# Patient Record
Sex: MALE | Race: WHITE | NOT HISPANIC OR LATINO
[De-identification: names, ages, dates, MRNs, and addresses within clinical notes are randomized per-mention and may not be internally consistent; named-entity substitution may affect disease eponyms.]

---

## 2019-11-24 ENCOUNTER — FORM ENCOUNTER (OUTPATIENT)
Age: 79
End: 2019-11-24

## 2020-01-02 PROBLEM — Z00.00 ENCOUNTER FOR PREVENTIVE HEALTH EXAMINATION: Status: ACTIVE | Noted: 2020-01-02

## 2020-01-07 ENCOUNTER — RECORD ABSTRACTING (OUTPATIENT)
Age: 80
End: 2020-01-07

## 2020-01-07 DIAGNOSIS — Z87.19 PERSONAL HISTORY OF OTHER DISEASES OF THE DIGESTIVE SYSTEM: ICD-10-CM

## 2020-01-07 DIAGNOSIS — Z87.891 PERSONAL HISTORY OF NICOTINE DEPENDENCE: ICD-10-CM

## 2020-01-07 DIAGNOSIS — Z87.438 PERSONAL HISTORY OF OTHER DISEASES OF MALE GENITAL ORGANS: ICD-10-CM

## 2020-01-07 DIAGNOSIS — Z87.448 PERSONAL HISTORY OF OTHER DISEASES OF URINARY SYSTEM: ICD-10-CM

## 2020-01-07 DIAGNOSIS — R39.9 UNSPECIFIED SYMPTOMS AND SIGNS INVOLVING THE GENITOURINARY SYSTEM: ICD-10-CM

## 2020-01-07 DIAGNOSIS — Z87.39 PERSONAL HISTORY OF OTHER DISEASES OF THE MUSCULOSKELETAL SYSTEM AND CONNECTIVE TISSUE: ICD-10-CM

## 2020-01-07 LAB
PSA FREE FLD-MCNC: 29
PSA FREE SERPL-MCNC: 0.6
PSA SERPL-MCNC: 2.1
TESTOST BND SERPL-MCNC: 265.6

## 2020-01-07 RX ORDER — MESALAMINE 500 MG/1
CAPSULE ORAL
Refills: 0 | Status: ACTIVE | COMMUNITY

## 2020-01-07 RX ORDER — FINASTERIDE 1 MG/1
TABLET ORAL
Refills: 0 | Status: ACTIVE | COMMUNITY

## 2020-01-07 RX ORDER — SILODOSIN 8 MG/1
CAPSULE ORAL
Refills: 0 | Status: ACTIVE | COMMUNITY

## 2020-01-07 RX ORDER — UBIQUINOL 100 MG
CAPSULE ORAL
Refills: 0 | Status: ACTIVE | COMMUNITY

## 2020-04-13 RX ORDER — FINASTERIDE 5 MG/1
5 TABLET, FILM COATED ORAL DAILY
Qty: 90 | Refills: 1 | Status: ACTIVE | COMMUNITY
Start: 2020-04-13 | End: 1900-01-01

## 2020-08-19 ENCOUNTER — APPOINTMENT (OUTPATIENT)
Dept: UROLOGY | Facility: CLINIC | Age: 80
End: 2020-08-19

## 2020-09-14 RX ORDER — SILODOSIN 8 MG/1
8 CAPSULE ORAL
Qty: 90 | Refills: 0 | Status: ACTIVE | COMMUNITY
Start: 2020-09-14 | End: 1900-01-01

## 2020-10-20 ENCOUNTER — APPOINTMENT (OUTPATIENT)
Dept: UROLOGY | Facility: CLINIC | Age: 80
End: 2020-10-20

## 2020-12-04 ENCOUNTER — APPOINTMENT (OUTPATIENT)
Dept: UROLOGY | Facility: CLINIC | Age: 80
End: 2020-12-04
Payer: MEDICARE

## 2020-12-04 DIAGNOSIS — R82.89 OTHER ABNRM FNDNGS ON CYTOLOGICAL: ICD-10-CM

## 2020-12-04 DIAGNOSIS — R97.20 ELEVATED PROSTATE, SPECIFIC ANTIGEN [PSA]: ICD-10-CM

## 2020-12-04 DIAGNOSIS — N13.8 BENIGN PROSTATIC HYPERPLASIA WITH LOWER URINARY TRACT SYMPMS: ICD-10-CM

## 2020-12-04 DIAGNOSIS — N40.1 BENIGN PROSTATIC HYPERPLASIA WITH LOWER URINARY TRACT SYMPMS: ICD-10-CM

## 2020-12-04 DIAGNOSIS — R31.21 ASYMPTOMATIC MICROSCOPIC HEMATURIA: ICD-10-CM

## 2020-12-04 PROCEDURE — 99443: CPT | Mod: 95

## 2020-12-04 RX ORDER — FINASTERIDE 5 MG/1
5 TABLET, FILM COATED ORAL DAILY
Qty: 90 | Refills: 3 | Status: ACTIVE | COMMUNITY
Start: 2020-12-04 | End: 1900-01-01

## 2020-12-04 RX ORDER — SILODOSIN 8 MG/1
8 CAPSULE ORAL
Qty: 90 | Refills: 3 | Status: ACTIVE | COMMUNITY
Start: 2020-12-04 | End: 1900-01-01

## 2020-12-04 NOTE — HISTORY OF PRESENT ILLNESS
[Other Location: e.g. School (Enter Location, City,State)___] : at [unfilled], at the time of the visit. [Other Location: e.g. Home (Enter Location, City,State)___] : at [unfilled] [Verbal consent obtained from patient] : the patient, [unfilled] [FreeTextEntry1] :  The patient-doctor relationship has been established via telephone communication. The patient's identity has been confirmed. The patient was previously emailed a copy of the telemedicine consent. They have had a chance to review and has now given verbal consent and has requested care to be assessed and treated through telemedicine and understands there maybe limitations in this process and they may need further follow up care in the office and or hospital settings. \par Verbal consent given on 12/4/2020, at 12:59 PM, by Rohan Berger.\par \par This 81 YO M was last seen on 9/3/2019 for BPH for which he is on silodosin and finasteride, and microhematuria with a prior positive FISH test. His PSA values have been: 2.1 (3/13/2019) and 2.2 (9/3/2019), both while on finasteride. At that visit I ordered UA C+S cytology FISH and PSA to be done, but patient developed issues with his Colon cancer that was resected in 2017 and did not do these.\par \par This visit today is to reassess treatment and reorder his medication. He tells me that he had a liver biopsy last year which demonstrated recurrence of the colon cancer and he underwent an ablation at [facility], where he continues to be followed. He has a PET  CT scan scheduled for next week. He tells me that he urinates with a good stream, no dysuria or hematuria, but continues to have nocturia at least x 2. HIs Crohns disease and HTN are presently under control, and he remains allergic to sulfa drugs only.\par \par  The patient denies fevers, chills, nausea and or vomiting and no unexplained weight loss. \par All pertinent parts of the patient PFSH (past medical, family and social histories), laboratory, radiological studies and physician notes were reviewed prior to starting the face to face portion of the telemedicine visit. Questionnaire results were discussed with patient.\par

## 2020-12-04 NOTE — LETTER BODY
[Dear  ___] : Dear  [unfilled], [Please see my note below.] : Please see my note below. [Consult Closing:] : Thank you very much for allowing me to participate in the care of this patient.  If you have any questions, please do not hesitate to contact me. [FreeTextEntry2] : Caren Behar, MD [FreeTextEntry1] : I had the pleasure of evaluation of your patient today via a telehealth virtual visit.\par  [FreeTextEntry3] : Best Regards, \par \par Swati Story MD\par

## 2020-12-04 NOTE — ASSESSMENT
[FreeTextEntry1] : We reviewed the present medicine regimen and I reordered the silodosin and finasteride. I again recommended that he have the UA, C+S cytology, FISH test and PSA levels drawn and call me to review the results. If all remains table, we will plan on FU in 1 tear. We ended the audio portion of the visit at 1:15 PM. Swati Story MD\par

## 2022-04-13 ENCOUNTER — EMERGENCY (EMERGENCY)
Facility: HOSPITAL | Age: 82
LOS: 1 days | Discharge: HOME | End: 2022-04-13
Attending: EMERGENCY MEDICINE | Admitting: EMERGENCY MEDICINE
Payer: MEDICARE

## 2022-04-13 VITALS
HEIGHT: 69 IN | TEMPERATURE: 98 F | DIASTOLIC BLOOD PRESSURE: 74 MMHG | RESPIRATION RATE: 18 BRPM | SYSTOLIC BLOOD PRESSURE: 106 MMHG | HEART RATE: 102 BPM | OXYGEN SATURATION: 100 % | WEIGHT: 147.05 LBS

## 2022-04-13 VITALS
OXYGEN SATURATION: 100 % | RESPIRATION RATE: 18 BRPM | DIASTOLIC BLOOD PRESSURE: 60 MMHG | TEMPERATURE: 98 F | HEART RATE: 83 BPM | SYSTOLIC BLOOD PRESSURE: 100 MMHG

## 2022-04-13 LAB
APPEARANCE UR: CLEAR — SIGNIFICANT CHANGE UP
BACTERIA # UR AUTO: PRESENT /HPF
BILIRUB UR-MCNC: NEGATIVE — SIGNIFICANT CHANGE UP
COLOR SPEC: YELLOW — SIGNIFICANT CHANGE UP
COMMENT - URINE: SIGNIFICANT CHANGE UP
DIFF PNL FLD: ABNORMAL
EPI CELLS # UR: SIGNIFICANT CHANGE UP /HPF (ref 0–5)
GLUCOSE UR QL: NEGATIVE — SIGNIFICANT CHANGE UP
KETONES UR-MCNC: NEGATIVE — SIGNIFICANT CHANGE UP
LEUKOCYTE ESTERASE UR-ACNC: ABNORMAL
NITRITE UR-MCNC: POSITIVE
PH UR: 7 — SIGNIFICANT CHANGE UP (ref 5–8)
PROT UR-MCNC: 100 MG/DL
RBC CASTS # UR COMP ASSIST: < 5 /HPF — SIGNIFICANT CHANGE UP
SP GR SPEC: 1.02 — SIGNIFICANT CHANGE UP (ref 1–1.03)
UROBILINOGEN FLD QL: 0.2 E.U./DL — SIGNIFICANT CHANGE UP
WBC UR QL: ABNORMAL /HPF

## 2022-04-13 PROCEDURE — 87186 SC STD MICRODIL/AGAR DIL: CPT

## 2022-04-13 PROCEDURE — 81001 URINALYSIS AUTO W/SCOPE: CPT

## 2022-04-13 PROCEDURE — 99284 EMERGENCY DEPT VISIT MOD MDM: CPT

## 2022-04-13 PROCEDURE — 51702 INSERT TEMP BLADDER CATH: CPT

## 2022-04-13 PROCEDURE — 87086 URINE CULTURE/COLONY COUNT: CPT

## 2022-04-13 PROCEDURE — 99283 EMERGENCY DEPT VISIT LOW MDM: CPT | Mod: FS

## 2022-04-13 RX ORDER — CEFTRIAXONE 500 MG/1
1000 INJECTION, POWDER, FOR SOLUTION INTRAMUSCULAR; INTRAVENOUS ONCE
Refills: 0 | Status: DISCONTINUED | OUTPATIENT
Start: 2022-04-13 | End: 2022-04-13

## 2022-04-13 RX ORDER — CEFPODOXIME PROXETIL 100 MG
1 TABLET ORAL
Qty: 14 | Refills: 0
Start: 2022-04-13 | End: 2022-04-19

## 2022-04-13 RX ORDER — CEFPODOXIME PROXETIL 100 MG
100 TABLET ORAL ONCE
Refills: 0 | Status: COMPLETED | OUTPATIENT
Start: 2022-04-13 | End: 2022-04-13

## 2022-04-13 RX ADMIN — Medication 100 MILLIGRAM(S): at 15:14

## 2022-04-13 NOTE — ED ADULT NURSE NOTE - NSIMPLEMENTINTERV_GEN_ALL_ED
Implemented All Fall Risk Interventions:  [place] to call system. Call bell, personal items and telephone within reach. Instruct patient to call for assistance. Room bathroom lighting operational. Non-slip footwear when patient is off stretcher. Physically safe environment: no spills, clutter or unnecessary equipment. Stretcher in lowest position, wheels locked, appropriate side rails in place. Provide visual cue, wrist band, yellow gown, etc. Monitor gait and stability. Monitor for mental status changes and reorient to person, place, and time. Review medications for side effects contributing to fall risk. Reinforce activity limits and safety measures with patient and family.

## 2022-04-13 NOTE — ED PROVIDER NOTE - PATIENT PORTAL LINK FT
You can access the FollowMyHealth Patient Portal offered by [facility] by registering at the following website: [email or web address]. By joining [facility]’s FollowMyHealth portal, you will also be able to view your health information using other applications (apps) compatible with our system.

## 2022-04-13 NOTE — ED PROVIDER NOTE - NS ED ATTENDING STATEMENT MOD
This was a shared visit with the BRITTA. I reviewed and verified the documentation and independently performed the documented:

## 2022-04-13 NOTE — ED PROVIDER NOTE - OBJECTIVE STATEMENT
80yo male with pmhx urinary retention with indwelling cam cathether BIBEMS for cam exchange. Pt currently at rehab and states he did not want staff to care for the catheter. He reports catheter was first placed 1mo ago at [facility] for urinary retention. He states he has been seen by a urologist with TOV but had to have catheter replaced. He states he is scheduled for urology follow up in a few days. Now reporting some drainage around the catheter. He denies fever, chills, abdominal pain, nausea/vomiting, hematuria.

## 2022-04-13 NOTE — ED PROVIDER NOTE - CLINICAL SUMMARY MEDICAL DECISION MAKING FREE TEXT BOX
Pt afebrile and hemodynamically stable. Here for cam exchange. Pt requesting TOV prior to replacement. Observed for 1.5hr but unable to urinate so cam replaced. UA notable for UTI, urine culture sent and pending. Pt given ceftriaxone 1g IV while in ED, dc with rx for cefpodoxime 100mg bid x7 days. Strict return precautions given. Pt has urology follow up scheduled.

## 2022-04-13 NOTE — ED ADULT NURSE REASSESSMENT NOTE - NS ED NURSE REASSESS COMMENT FT1
Christopher catheter inserted using sterile technique, draining by gravity, secured with StatLock. Second RN present to confirm sterility. UA, UC sent to lab.

## 2022-04-13 NOTE — ED ADULT NURSE NOTE - CHIEF COMPLAINT QUOTE
Pt BIBA from [facility] nursing [place] w/ cam catheter in place for 1 month.  Hx colon ca, last chemo 9 days ago. Pt scheduled for removal of cam catheter at nursing home, did not trust nursing home staff for removal, requested transfer to ED for removal. Catheter draining w/o issue.

## 2022-04-13 NOTE — ED PROVIDER NOTE - PHYSICAL EXAMINATION
VITAL SIGNS: I have reviewed nursing notes and confirm.  CONSTITUTIONAL: Well-developed; in no acute distress.   SKIN:  warm and dry, no acute rash.   HEAD:  normocephalic, atraumatic.  EYES: PERRL, EOM intact; conjunctiva and sclera clear.  ENT: No nasal discharge; airway clear.   NECK: Supple; non tender.  CARD: S1, S2 normal; no murmurs, gallops, or rubs. Regular rate and rhythm.   RESP:  Clear to auscultation b/l, no wheezes, rales or rhonchi.  ABD: Normal bowel sounds; soft; non-distended; non-tender; no guarding/ rebound.  : Christopher in place with pale yellow urine in leg bag. He has small amount of drainage at urethral meatus.   EXT: Normal ROM. No clubbing, cyanosis or edema. 2+ pulses to b/l ue/le.  NEURO: Alert, oriented, grossly unremarkable  PSYCH: Cooperative, mood and affect appropriate.

## 2022-04-13 NOTE — ED PROVIDER NOTE - NSFOLLOWUPINSTRUCTIONS_ED_ALL_ED_FT
Your cam catheter was exchanged today.    You have a urinary tract infection and were given a dose of ceftriaxone IV while in the Emergency Department. Please take one tab of cefpodoxime 100mg twice daily for 1 week.    Please follow up with your urologist as scheduled.    Return to the Emergency Department if you develop fever>100.4F, abdominal pain, vomiting, worsening pain at cam site or any other concerns.      Urinary Tract Infection, Adult       A urinary tract infection (UTI) is an infection of any part of the urinary tract. The urinary tract includes the kidneys, ureters, bladder, and urethra. These organs make, store, and get rid of urine in the body.    An upper UTI affects the ureters and kidneys. A lower UTI affects the bladder and urethra.      What are the causes?    Most urinary tract infections are caused by bacteria in your genital area around your urethra, where urine leaves your body. These bacteria grow and cause inflammation of your urinary tract.      What increases the risk?    You are more likely to develop this condition if:  •You have a urinary catheter that stays in place.      •You are not able to control when you urinate or have a bowel movement (incontinence).    •You are female and you:  •Use a spermicide or diaphragm for birth control.      •Have low estrogen levels.      •Are pregnant.        •You have certain genes that increase your risk.      •You are sexually active.      •You take antibiotic medicines.    •You have a condition that causes your flow of urine to slow down, such as:  •An enlarged prostate, if you are male.      •Blockage in your urethra.      •A kidney stone.      •A nerve condition that affects your bladder control (neurogenic bladder).      •Not getting enough to drink, or not urinating often.      •You have certain medical conditions, such as:  •Diabetes.      •A weak disease-fighting system (immunesystem).      •Sickle cell disease.      •Gout.      •Spinal cord injury.          What are the signs or symptoms?    Symptoms of this condition include:  •Needing to urinate right away (urgency).      •Frequent urination. This may include small amounts of urine each time you urinate.      •Pain or burning with urination.      •Blood in the urine.      •Urine that smells bad or unusual.      •Trouble urinating.      •Cloudy urine.      •Vaginal discharge, if you are female.      •Pain in the abdomen or the lower back.      You may also have:  •Vomiting or a decreased appetite.      •Confusion.      •Irritability or tiredness.      •A fever or chills.      •Diarrhea.      The first symptom in older adults may be confusion. In some cases, they may not have any symptoms until the infection has worsened.      How is this diagnosed?    This condition is diagnosed based on your medical history and a physical exam. You may also have other tests, including:  •Urine tests.      •Blood tests.      •Tests for STIs (sexually transmitted infections).      If you have had more than one UTI, a cystoscopy or imaging studies may be done to determine the cause of the infections.      How is this treated?    Treatment for this condition includes:  •Antibiotic medicine.      •Over-the-counter medicines to treat discomfort.      •Drinking enough water to stay hydrated.      If you have frequent infections or have other conditions such as a kidney stone, you may need to see a health care provider who specializes in the urinary tract (urologist).    In rare cases, urinary tract infections can cause sepsis. Sepsis is a life-threatening condition that occurs when the body responds to an infection. Sepsis is treated in the hospital with IV antibiotics, fluids, and other medicines.      Follow these instructions at home:     Medicines     •Take over-the-counter and prescription medicines only as told by your health care provider.      •If you were prescribed an antibiotic medicine, take it as told by your health care provider. Do not stop using the antibiotic even if you start to feel better.      General instructions   •Make sure you:  •Empty your bladder often and completely. Do not hold urine for long periods of time.      •Empty your bladder after sex.      •Wipe from front to back after urinating or having a bowel movement if you are female. Use each tissue only one time when you wipe.        •Drink enough fluid to keep your urine pale yellow.      •Keep all follow-up visits. This is important.        Contact a health care provider if:    •Your symptoms do not get better after 1–2 days.      •Your symptoms go away and then return.        Get help right away if:    •You have severe pain in your back or your lower abdomen.      •You have a fever or chills.      •You have nausea or vomiting.        Summary    •A urinary tract infection (UTI) is an infection of any part of the urinary tract, which includes the kidneys, ureters, bladder, and urethra.      •Most urinary tract infections are caused by bacteria in your genital area.      •Treatment for this condition often includes antibiotic medicines.      •If you were prescribed an antibiotic medicine, take it as told by your health care provider. Do not stop using the antibiotic even if you start to feel better.      •Keep all follow-up visits. This is important.      This information is not intended to replace advice given to you by your health care provider. Make sure you discuss any questions you have with your health care provider.

## 2022-04-13 NOTE — ED PROVIDER NOTE - CARE PLAN
Principal Discharge DX:	Christopher catheter problem  Secondary Diagnosis:	Urinary tract infection   1

## 2022-04-13 NOTE — ED ADULT NURSE NOTE - OBJECTIVE STATEMENT
81y M, PMHx of colon cancer (last chemo 9 days ago) and cam catheter in place x1 month. Presents to the ED requesting to have his cam cath removed. Pt stated, "I do not trust the nursing home staff to remove my cam and I wanted it removed in the ER." Denies CP, back pain, abdominal pain, fever, chills, nausea, blood thinners.

## 2022-04-13 NOTE — ED PROVIDER NOTE - ATTENDING CONTRIBUTION TO CARE
Mild triage tachycardia self resolved, other vitals wnl. Exam as above.  Failed TOV, cam replaced.   abx for possible UTI.   Discussed importance of outpt follow up and return precautions. Clinically no indication for further emergent ED workup or hospitalization at this time. Comfortable for dc, outpt f/u.

## 2022-04-13 NOTE — ED ADULT TRIAGE NOTE - CHIEF COMPLAINT QUOTE
Pt BIBA from [facility] nursing [place] w/ cam catheter in place for 1 month.  Hx colon ca, last chemo 9 days ago. Pt scheduled for removal of cam catheter at nursing home, did not trust nursing home staff for removal, requested transfer to ED for removal. Catheter draining w/o issue.

## 2022-04-13 NOTE — ED PROVIDER NOTE - NS ED ROS FT
Constitutional: No fever. No chills.  Eyes: No redness. No discharge. No vision change.   ENT: No sore throat. No ear pain.  Cardiovascular: No chest pain. No leg swelling.  Respiratory: No cough. No shortness of breath.  GI: No abdominal pain. No vomiting. No diarrhea.   MSK: No joint pain. No back pain.   : +cam catheter problem.  Skin: No rash. No abrasions.   Neuro: No numbness. No weakness.   Psych: No known mental health issues.

## 2022-04-15 DIAGNOSIS — T83.031A LEAKAGE OF INDWELLING URETHRAL CATHETER, INITIAL ENCOUNTER: ICD-10-CM
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
## 2022-04-15 DIAGNOSIS — Z88.2 ALLERGY STATUS TO SULFONAMIDES: ICD-10-CM

## 2022-04-15 DIAGNOSIS — R33.9 RETENTION OF URINE, UNSPECIFIED: ICD-10-CM

## 2022-04-15 DIAGNOSIS — N39.0 URINARY TRACT INFECTION, SITE NOT SPECIFIED: ICD-10-CM

## 2022-04-15 DIAGNOSIS — Z88.0 ALLERGY STATUS TO PENICILLIN: ICD-10-CM
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
## 2022-04-15 LAB
-  AMPICILLIN/SULBACTAM: SIGNIFICANT CHANGE UP
-  AMPICILLIN/SULBACTAM: SIGNIFICANT CHANGE UP
-  AMPICILLIN: SIGNIFICANT CHANGE UP
-  AMPICILLIN: SIGNIFICANT CHANGE UP
-  CEFAZOLIN: SIGNIFICANT CHANGE UP
-  CEFAZOLIN: SIGNIFICANT CHANGE UP
-  CEFEPIME: SIGNIFICANT CHANGE UP
-  CEFTRIAXONE: SIGNIFICANT CHANGE UP
-  CEFTRIAXONE: SIGNIFICANT CHANGE UP
-  CIPROFLOXACIN: SIGNIFICANT CHANGE UP
-  CIPROFLOXACIN: SIGNIFICANT CHANGE UP
-  ERTAPENEM: SIGNIFICANT CHANGE UP
-  ERTAPENEM: SIGNIFICANT CHANGE UP
-  GENTAMICIN: SIGNIFICANT CHANGE UP
-  GENTAMICIN: SIGNIFICANT CHANGE UP
-  NITROFURANTOIN: SIGNIFICANT CHANGE UP
-  NITROFURANTOIN: SIGNIFICANT CHANGE UP
-  PIPERACILLIN/TAZOBACTAM: SIGNIFICANT CHANGE UP
-  PIPERACILLIN/TAZOBACTAM: SIGNIFICANT CHANGE UP
-  TOBRAMYCIN: SIGNIFICANT CHANGE UP
-  TOBRAMYCIN: SIGNIFICANT CHANGE UP
-  TRIMETHOPRIM/SULFAMETHOXAZOLE: SIGNIFICANT CHANGE UP
-  TRIMETHOPRIM/SULFAMETHOXAZOLE: SIGNIFICANT CHANGE UP
CULTURE RESULTS: SIGNIFICANT CHANGE UP
METHOD TYPE: SIGNIFICANT CHANGE UP
METHOD TYPE: SIGNIFICANT CHANGE UP
ORGANISM # SPEC MICROSCOPIC CNT: SIGNIFICANT CHANGE UP
SPECIMEN SOURCE: SIGNIFICANT CHANGE UP

## 2022-04-23 ENCOUNTER — INPATIENT (INPATIENT)
Facility: HOSPITAL | Age: 82
LOS: 3 days | Discharge: NURSING, REHAB OR LONG-TERM CARE | DRG: 853 | End: 2022-04-27
Admitting: STUDENT IN AN ORGANIZED HEALTH CARE EDUCATION/TRAINING PROGRAM
Payer: MEDICARE

## 2022-04-23 VITALS
SYSTOLIC BLOOD PRESSURE: 73 MMHG | WEIGHT: 141.1 LBS | HEART RATE: 103 BPM | RESPIRATION RATE: 16 BRPM | OXYGEN SATURATION: 100 % | DIASTOLIC BLOOD PRESSURE: 42 MMHG | HEIGHT: 69 IN

## 2022-04-23 PROBLEM — R33.9 RETENTION OF URINE, UNSPECIFIED: Chronic | Status: ACTIVE | Noted: 2022-04-13

## 2022-04-23 LAB
ALBUMIN SERPL ELPH-MCNC: 1.3 G/DL — LOW (ref 3.3–5)
ALBUMIN SERPL ELPH-MCNC: 1.7 G/DL — LOW (ref 3.3–5)
ALP SERPL-CCNC: 383 U/L — HIGH (ref 40–120)
ALP SERPL-CCNC: 520 U/L — HIGH (ref 40–120)
ALT FLD-CCNC: 54 U/L — HIGH (ref 10–45)
ALT FLD-CCNC: 88 U/L — HIGH (ref 10–45)
ANION GAP SERPL CALC-SCNC: 10 MMOL/L — SIGNIFICANT CHANGE UP (ref 5–17)
ANION GAP SERPL CALC-SCNC: 10 MMOL/L — SIGNIFICANT CHANGE UP (ref 5–17)
ANION GAP SERPL CALC-SCNC: 12 MMOL/L — SIGNIFICANT CHANGE UP (ref 5–17)
APPEARANCE UR: CLEAR — SIGNIFICANT CHANGE UP
APTT BLD: 33.7 SEC — SIGNIFICANT CHANGE UP (ref 27.5–35.5)
AST SERPL-CCNC: 126 U/L — HIGH (ref 10–40)
AST SERPL-CCNC: 202 U/L — HIGH (ref 10–40)
BACTERIA # UR AUTO: PRESENT /HPF
BASE EXCESS BLDA CALC-SCNC: -4.5 MMOL/L — LOW (ref -2–3)
BASE EXCESS BLDV CALC-SCNC: -12.3 MMOL/L — LOW (ref -2–3)
BASOPHILS # BLD AUTO: 0.01 K/UL — SIGNIFICANT CHANGE UP (ref 0–0.2)
BASOPHILS # BLD AUTO: 0.02 K/UL — SIGNIFICANT CHANGE UP (ref 0–0.2)
BASOPHILS NFR BLD AUTO: 0.1 % — SIGNIFICANT CHANGE UP (ref 0–2)
BASOPHILS NFR BLD AUTO: 0.1 % — SIGNIFICANT CHANGE UP (ref 0–2)
BILIRUB SERPL-MCNC: 0.4 MG/DL — SIGNIFICANT CHANGE UP (ref 0.2–1.2)
BILIRUB SERPL-MCNC: 0.7 MG/DL — SIGNIFICANT CHANGE UP (ref 0.2–1.2)
BILIRUB UR-MCNC: NEGATIVE — SIGNIFICANT CHANGE UP
BLD GP AB SCN SERPL QL: NEGATIVE — SIGNIFICANT CHANGE UP
BUN SERPL-MCNC: 31 MG/DL — HIGH (ref 7–23)
BUN SERPL-MCNC: 43 MG/DL — HIGH (ref 7–23)
BUN SERPL-MCNC: 49 MG/DL — HIGH (ref 7–23)
CALCIUM SERPL-MCNC: 4.6 MG/DL — CRITICAL LOW (ref 8.4–10.5)
CALCIUM SERPL-MCNC: 7 MG/DL — LOW (ref 8.4–10.5)
CALCIUM SERPL-MCNC: 7.4 MG/DL — LOW (ref 8.4–10.5)
CHLORIDE SERPL-SCNC: 103 MMOL/L — SIGNIFICANT CHANGE UP (ref 96–108)
CHLORIDE SERPL-SCNC: 106 MMOL/L — SIGNIFICANT CHANGE UP (ref 96–108)
CHLORIDE SERPL-SCNC: 116 MMOL/L — HIGH (ref 96–108)
CK SERPL-CCNC: 868 U/L — HIGH (ref 30–200)
CO2 BLDA-SCNC: 21 MMOL/L — SIGNIFICANT CHANGE UP (ref 19–24)
CO2 BLDV-SCNC: 12.8 MMOL/L — LOW (ref 22–26)
CO2 SERPL-SCNC: 16 MMOL/L — LOW (ref 22–31)
CO2 SERPL-SCNC: 18 MMOL/L — LOW (ref 22–31)
CO2 SERPL-SCNC: 21 MMOL/L — LOW (ref 22–31)
COLOR SPEC: YELLOW — SIGNIFICANT CHANGE UP
CREAT SERPL-MCNC: 0.9 MG/DL — SIGNIFICANT CHANGE UP (ref 0.5–1.3)
CREAT SERPL-MCNC: 0.91 MG/DL — SIGNIFICANT CHANGE UP (ref 0.5–1.3)
CREAT SERPL-MCNC: 1.23 MG/DL — SIGNIFICANT CHANGE UP (ref 0.5–1.3)
CRP SERPL-MCNC: 196.2 MG/L — HIGH (ref 0–4)
DIFF PNL FLD: ABNORMAL
EGFR: 60 ML/MIN/1.73M2 — SIGNIFICANT CHANGE UP
EGFR: 86 ML/MIN/1.73M2 — SIGNIFICANT CHANGE UP
EGFR: 87 ML/MIN/1.73M2 — SIGNIFICANT CHANGE UP
EOSINOPHIL # BLD AUTO: 0.01 K/UL — SIGNIFICANT CHANGE UP (ref 0–0.5)
EOSINOPHIL # BLD AUTO: 0.01 K/UL — SIGNIFICANT CHANGE UP (ref 0–0.5)
EOSINOPHIL NFR BLD AUTO: 0 % — SIGNIFICANT CHANGE UP (ref 0–6)
EOSINOPHIL NFR BLD AUTO: 0.1 % — SIGNIFICANT CHANGE UP (ref 0–6)
EPI CELLS # UR: SIGNIFICANT CHANGE UP /HPF (ref 0–5)
ERYTHROCYTE [SEDIMENTATION RATE] IN BLOOD: 83 MM/HR — HIGH
GAS PNL BLDA: SIGNIFICANT CHANGE UP
GAS PNL BLDV: SIGNIFICANT CHANGE UP
GLUCOSE BLDC GLUCOMTR-MCNC: 95 MG/DL — SIGNIFICANT CHANGE UP (ref 70–99)
GLUCOSE SERPL-MCNC: 131 MG/DL — HIGH (ref 70–99)
GLUCOSE SERPL-MCNC: 65 MG/DL — LOW (ref 70–99)
GLUCOSE SERPL-MCNC: 99 MG/DL — SIGNIFICANT CHANGE UP (ref 70–99)
GLUCOSE UR QL: NEGATIVE — SIGNIFICANT CHANGE UP
GRAN CASTS # UR COMP ASSIST: ABNORMAL /LPF
HCO3 BLDA-SCNC: 20 MMOL/L — LOW (ref 21–28)
HCO3 BLDV-SCNC: 12 MMOL/L — LOW (ref 22–29)
HCT VFR BLD CALC: 18.9 % — CRITICAL LOW (ref 39–50)
HCT VFR BLD CALC: 26.8 % — LOW (ref 39–50)
HCT VFR BLD CALC: 28.5 % — LOW (ref 39–50)
HGB BLD-MCNC: 5.7 G/DL — CRITICAL LOW (ref 13–17)
HGB BLD-MCNC: 8.2 G/DL — LOW (ref 13–17)
HGB BLD-MCNC: 8.5 G/DL — LOW (ref 13–17)
IMM GRANULOCYTES NFR BLD AUTO: 1 % — SIGNIFICANT CHANGE UP (ref 0–1.5)
IMM GRANULOCYTES NFR BLD AUTO: 1.3 % — SIGNIFICANT CHANGE UP (ref 0–1.5)
INR BLD: 1.98 — HIGH (ref 0.88–1.16)
KETONES UR-MCNC: NEGATIVE — SIGNIFICANT CHANGE UP
LACTATE SERPL-SCNC: 1.6 MMOL/L — SIGNIFICANT CHANGE UP (ref 0.5–2)
LACTATE SERPL-SCNC: 2.2 MMOL/L — HIGH (ref 0.5–2)
LACTATE SERPL-SCNC: 3.3 MMOL/L — HIGH (ref 0.5–2)
LEUKOCYTE ESTERASE UR-ACNC: ABNORMAL
LYMPHOCYTES # BLD AUTO: 0.88 K/UL — LOW (ref 1–3.3)
LYMPHOCYTES # BLD AUTO: 1.34 K/UL — SIGNIFICANT CHANGE UP (ref 1–3.3)
LYMPHOCYTES # BLD AUTO: 6.1 % — LOW (ref 13–44)
LYMPHOCYTES # BLD AUTO: 6.4 % — LOW (ref 13–44)
MAGNESIUM SERPL-MCNC: 1.5 MG/DL — LOW (ref 1.6–2.6)
MCHC RBC-ENTMCNC: 24.2 PG — LOW (ref 27–34)
MCHC RBC-ENTMCNC: 24.3 PG — LOW (ref 27–34)
MCHC RBC-ENTMCNC: 24.4 PG — LOW (ref 27–34)
MCHC RBC-ENTMCNC: 29.8 GM/DL — LOW (ref 32–36)
MCHC RBC-ENTMCNC: 30.2 GM/DL — LOW (ref 32–36)
MCHC RBC-ENTMCNC: 30.6 GM/DL — LOW (ref 32–36)
MCV RBC AUTO: 79.3 FL — LOW (ref 80–100)
MCV RBC AUTO: 80.8 FL — SIGNIFICANT CHANGE UP (ref 80–100)
MCV RBC AUTO: 81.2 FL — SIGNIFICANT CHANGE UP (ref 80–100)
MONOCYTES # BLD AUTO: 0.79 K/UL — SIGNIFICANT CHANGE UP (ref 0–0.9)
MONOCYTES # BLD AUTO: 1.14 K/UL — HIGH (ref 0–0.9)
MONOCYTES NFR BLD AUTO: 5.5 % — SIGNIFICANT CHANGE UP (ref 2–14)
MONOCYTES NFR BLD AUTO: 5.5 % — SIGNIFICANT CHANGE UP (ref 2–14)
NEUTROPHILS # BLD AUTO: 12.55 K/UL — HIGH (ref 1.8–7.4)
NEUTROPHILS # BLD AUTO: 18.1 K/UL — HIGH (ref 1.8–7.4)
NEUTROPHILS NFR BLD AUTO: 86.7 % — HIGH (ref 43–77)
NEUTROPHILS NFR BLD AUTO: 87.2 % — HIGH (ref 43–77)
NITRITE UR-MCNC: NEGATIVE — SIGNIFICANT CHANGE UP
NRBC # BLD: 0 /100 WBCS — SIGNIFICANT CHANGE UP (ref 0–0)
PCO2 BLDA: 36 MMHG — SIGNIFICANT CHANGE UP (ref 35–48)
PCO2 BLDV: 24 MMHG — LOW (ref 42–55)
PH BLDA: 7.36 — SIGNIFICANT CHANGE UP (ref 7.35–7.45)
PH BLDV: 7.31 — LOW (ref 7.32–7.43)
PH UR: 5.5 — SIGNIFICANT CHANGE UP (ref 5–8)
PHOSPHATE SERPL-MCNC: 3.7 MG/DL — SIGNIFICANT CHANGE UP (ref 2.5–4.5)
PLATELET # BLD AUTO: 103 K/UL — LOW (ref 150–400)
PLATELET # BLD AUTO: 126 K/UL — LOW (ref 150–400)
PLATELET # BLD AUTO: 137 K/UL — LOW (ref 150–400)
PO2 BLDA: 184 MMHG — HIGH (ref 83–108)
PO2 BLDV: 39 MMHG — SIGNIFICANT CHANGE UP (ref 25–45)
POTASSIUM SERPL-MCNC: 3.1 MMOL/L — LOW (ref 3.5–5.3)
POTASSIUM SERPL-MCNC: 4.6 MMOL/L — SIGNIFICANT CHANGE UP (ref 3.5–5.3)
POTASSIUM SERPL-MCNC: 4.7 MMOL/L — SIGNIFICANT CHANGE UP (ref 3.5–5.3)
POTASSIUM SERPL-SCNC: 3.1 MMOL/L — LOW (ref 3.5–5.3)
POTASSIUM SERPL-SCNC: 4.6 MMOL/L — SIGNIFICANT CHANGE UP (ref 3.5–5.3)
POTASSIUM SERPL-SCNC: 4.7 MMOL/L — SIGNIFICANT CHANGE UP (ref 3.5–5.3)
PROT SERPL-MCNC: 3.6 G/DL — LOW (ref 6–8.3)
PROT SERPL-MCNC: 5 G/DL — LOW (ref 6–8.3)
PROT UR-MCNC: 30 MG/DL
PROTHROM AB SERPL-ACNC: 23.7 SEC — HIGH (ref 10.5–13.4)
RAPID RVP RESULT: SIGNIFICANT CHANGE UP
RBC # BLD: 2.34 M/UL — LOW (ref 4.2–5.8)
RBC # BLD: 3.38 M/UL — LOW (ref 4.2–5.8)
RBC # BLD: 3.51 M/UL — LOW (ref 4.2–5.8)
RBC # FLD: 16.7 % — HIGH (ref 10.3–14.5)
RBC # FLD: 16.8 % — HIGH (ref 10.3–14.5)
RBC # FLD: 16.9 % — HIGH (ref 10.3–14.5)
RBC CASTS # UR COMP ASSIST: > 10 /HPF
RH IG SCN BLD-IMP: POSITIVE — SIGNIFICANT CHANGE UP
SAO2 % BLDA: 98.7 % — HIGH (ref 94–98)
SAO2 % BLDV: 63.1 % — LOW (ref 67–88)
SARS-COV-2 RNA SPEC QL NAA+PROBE: SIGNIFICANT CHANGE UP
SODIUM SERPL-SCNC: 134 MMOL/L — LOW (ref 135–145)
SODIUM SERPL-SCNC: 136 MMOL/L — SIGNIFICANT CHANGE UP (ref 135–145)
SODIUM SERPL-SCNC: 142 MMOL/L — SIGNIFICANT CHANGE UP (ref 135–145)
SP GR SPEC: 1.02 — SIGNIFICANT CHANGE UP (ref 1–1.03)
TROPONIN T SERPL-MCNC: 0.1 NG/ML — CRITICAL HIGH (ref 0–0.01)
TROPONIN T SERPL-MCNC: 0.12 NG/ML — CRITICAL HIGH (ref 0–0.01)
TSH SERPL-MCNC: 1.03 UIU/ML — SIGNIFICANT CHANGE UP (ref 0.27–4.2)
UROBILINOGEN FLD QL: 1 E.U./DL — SIGNIFICANT CHANGE UP
WBC # BLD: 14.38 K/UL — HIGH (ref 3.8–10.5)
WBC # BLD: 15.43 K/UL — HIGH (ref 3.8–10.5)
WBC # BLD: 20.88 K/UL — HIGH (ref 3.8–10.5)
WBC # FLD AUTO: 14.38 K/UL — HIGH (ref 3.8–10.5)
WBC # FLD AUTO: 15.43 K/UL — HIGH (ref 3.8–10.5)
WBC # FLD AUTO: 20.88 K/UL — HIGH (ref 3.8–10.5)
WBC UR QL: ABNORMAL /HPF

## 2022-04-23 PROCEDURE — 93010 ELECTROCARDIOGRAM REPORT: CPT

## 2022-04-23 PROCEDURE — 71045 X-RAY EXAM CHEST 1 VIEW: CPT | Mod: 26

## 2022-04-23 PROCEDURE — 99291 CRITICAL CARE FIRST HOUR: CPT

## 2022-04-23 PROCEDURE — 99291 CRITICAL CARE FIRST HOUR: CPT | Mod: CS

## 2022-04-23 RX ORDER — CEFTRIAXONE 500 MG/1
1000 INJECTION, POWDER, FOR SOLUTION INTRAMUSCULAR; INTRAVENOUS EVERY 24 HOURS
Refills: 0 | Status: DISCONTINUED | OUTPATIENT
Start: 2022-04-24 | End: 2022-04-24

## 2022-04-23 RX ORDER — DEXTROSE 50 % IN WATER 50 %
25 SYRINGE (ML) INTRAVENOUS ONCE
Refills: 0 | Status: DISCONTINUED | OUTPATIENT
Start: 2022-04-23 | End: 2022-04-27

## 2022-04-23 RX ORDER — SODIUM CHLORIDE 9 MG/ML
1000 INJECTION INTRAMUSCULAR; INTRAVENOUS; SUBCUTANEOUS ONCE
Refills: 0 | Status: COMPLETED | OUTPATIENT
Start: 2022-04-23 | End: 2022-04-23

## 2022-04-23 RX ORDER — HYDROMORPHONE HYDROCHLORIDE 2 MG/ML
0.5 INJECTION INTRAMUSCULAR; INTRAVENOUS; SUBCUTANEOUS ONCE
Refills: 0 | Status: DISCONTINUED | OUTPATIENT
Start: 2022-04-23 | End: 2022-04-23

## 2022-04-23 RX ORDER — GLUCAGON INJECTION, SOLUTION 0.5 MG/.1ML
1 INJECTION, SOLUTION SUBCUTANEOUS ONCE
Refills: 0 | Status: DISCONTINUED | OUTPATIENT
Start: 2022-04-23 | End: 2022-04-27

## 2022-04-23 RX ORDER — POLYETHYLENE GLYCOL 3350 17 G/17G
17 POWDER, FOR SOLUTION ORAL DAILY
Refills: 0 | Status: DISCONTINUED | OUTPATIENT
Start: 2022-04-23 | End: 2022-04-26

## 2022-04-23 RX ORDER — SODIUM CHLORIDE 9 MG/ML
1000 INJECTION, SOLUTION INTRAVENOUS ONCE
Refills: 0 | Status: COMPLETED | OUTPATIENT
Start: 2022-04-23 | End: 2022-04-23

## 2022-04-23 RX ORDER — HYDROCORTISONE 20 MG
100 TABLET ORAL ONCE
Refills: 0 | Status: COMPLETED | OUTPATIENT
Start: 2022-04-23 | End: 2022-04-23

## 2022-04-23 RX ORDER — SODIUM CHLORIDE 9 MG/ML
1000 INJECTION, SOLUTION INTRAVENOUS
Refills: 0 | Status: DISCONTINUED | OUTPATIENT
Start: 2022-04-23 | End: 2022-04-27

## 2022-04-23 RX ORDER — DEXTROSE 50 % IN WATER 50 %
15 SYRINGE (ML) INTRAVENOUS ONCE
Refills: 0 | Status: DISCONTINUED | OUTPATIENT
Start: 2022-04-23 | End: 2022-04-27

## 2022-04-23 RX ORDER — CHLORHEXIDINE GLUCONATE 213 G/1000ML
1 SOLUTION TOPICAL
Refills: 0 | Status: DISCONTINUED | OUTPATIENT
Start: 2022-04-23 | End: 2022-04-27

## 2022-04-23 RX ORDER — NOREPINEPHRINE BITARTRATE/D5W 8 MG/250ML
0.05 PLASTIC BAG, INJECTION (ML) INTRAVENOUS
Qty: 8 | Refills: 0 | Status: DISCONTINUED | OUTPATIENT
Start: 2022-04-23 | End: 2022-04-25

## 2022-04-23 RX ORDER — PANTOPRAZOLE SODIUM 20 MG/1
40 TABLET, DELAYED RELEASE ORAL
Refills: 0 | Status: DISCONTINUED | OUTPATIENT
Start: 2022-04-23 | End: 2022-04-27

## 2022-04-23 RX ORDER — ENOXAPARIN SODIUM 100 MG/ML
40 INJECTION SUBCUTANEOUS EVERY 24 HOURS
Refills: 0 | Status: DISCONTINUED | OUTPATIENT
Start: 2022-04-23 | End: 2022-04-26

## 2022-04-23 RX ORDER — MAGNESIUM SULFATE 500 MG/ML
4 VIAL (ML) INJECTION ONCE
Refills: 0 | Status: COMPLETED | OUTPATIENT
Start: 2022-04-23 | End: 2022-04-23

## 2022-04-23 RX ORDER — VANCOMYCIN HCL 1 G
1250 VIAL (EA) INTRAVENOUS EVERY 12 HOURS
Refills: 0 | Status: COMPLETED | OUTPATIENT
Start: 2022-04-23 | End: 2022-04-24

## 2022-04-23 RX ORDER — VANCOMYCIN HCL 1 G
1000 VIAL (EA) INTRAVENOUS ONCE
Refills: 0 | Status: COMPLETED | OUTPATIENT
Start: 2022-04-23 | End: 2022-04-23

## 2022-04-23 RX ORDER — CEFEPIME 1 G/1
1000 INJECTION, POWDER, FOR SOLUTION INTRAMUSCULAR; INTRAVENOUS ONCE
Refills: 0 | Status: COMPLETED | OUTPATIENT
Start: 2022-04-23 | End: 2022-04-23

## 2022-04-23 RX ORDER — HYDROMORPHONE HYDROCHLORIDE 2 MG/ML
1 INJECTION INTRAMUSCULAR; INTRAVENOUS; SUBCUTANEOUS ONCE
Refills: 0 | Status: DISCONTINUED | OUTPATIENT
Start: 2022-04-23 | End: 2022-04-23

## 2022-04-23 RX ORDER — INSULIN LISPRO 100/ML
VIAL (ML) SUBCUTANEOUS
Refills: 0 | Status: DISCONTINUED | OUTPATIENT
Start: 2022-04-23 | End: 2022-04-27

## 2022-04-23 RX ORDER — ACETAMINOPHEN 500 MG
1000 TABLET ORAL ONCE
Refills: 0 | Status: COMPLETED | OUTPATIENT
Start: 2022-04-23 | End: 2022-04-23

## 2022-04-23 RX ORDER — DEXTROSE 50 % IN WATER 50 %
12.5 SYRINGE (ML) INTRAVENOUS ONCE
Refills: 0 | Status: DISCONTINUED | OUTPATIENT
Start: 2022-04-23 | End: 2022-04-27

## 2022-04-23 RX ORDER — HYDROMORPHONE HYDROCHLORIDE 2 MG/ML
0.5 INJECTION INTRAMUSCULAR; INTRAVENOUS; SUBCUTANEOUS
Refills: 0 | Status: DISCONTINUED | OUTPATIENT
Start: 2022-04-23 | End: 2022-04-24

## 2022-04-23 RX ADMIN — Medication 6 MICROGRAM(S)/KG/MIN: at 19:39

## 2022-04-23 RX ADMIN — Medication 50 MILLIGRAM(S): at 18:59

## 2022-04-23 RX ADMIN — HYDROMORPHONE HYDROCHLORIDE 0.5 MILLIGRAM(S): 2 INJECTION INTRAMUSCULAR; INTRAVENOUS; SUBCUTANEOUS at 22:15

## 2022-04-23 RX ADMIN — SODIUM CHLORIDE 1000 MILLILITER(S): 9 INJECTION, SOLUTION INTRAVENOUS at 11:48

## 2022-04-23 RX ADMIN — SODIUM CHLORIDE 1000 MILLILITER(S): 9 INJECTION INTRAMUSCULAR; INTRAVENOUS; SUBCUTANEOUS at 07:51

## 2022-04-23 RX ADMIN — Medication 250 MILLIGRAM(S): at 08:30

## 2022-04-23 RX ADMIN — ENOXAPARIN SODIUM 40 MILLIGRAM(S): 100 INJECTION SUBCUTANEOUS at 13:39

## 2022-04-23 RX ADMIN — SODIUM CHLORIDE 1000 MILLILITER(S): 9 INJECTION INTRAMUSCULAR; INTRAVENOUS; SUBCUTANEOUS at 08:31

## 2022-04-23 RX ADMIN — HYDROMORPHONE HYDROCHLORIDE 0.5 MILLIGRAM(S): 2 INJECTION INTRAMUSCULAR; INTRAVENOUS; SUBCUTANEOUS at 17:10

## 2022-04-23 RX ADMIN — CEFEPIME 100 MILLIGRAM(S): 1 INJECTION, POWDER, FOR SOLUTION INTRAMUSCULAR; INTRAVENOUS at 07:54

## 2022-04-23 RX ADMIN — Medication 100 MILLIGRAM(S): at 13:38

## 2022-04-23 RX ADMIN — SODIUM CHLORIDE 1000 MILLILITER(S): 9 INJECTION INTRAMUSCULAR; INTRAVENOUS; SUBCUTANEOUS at 10:10

## 2022-04-23 RX ADMIN — Medication 166.67 MILLIGRAM(S): at 21:05

## 2022-04-23 RX ADMIN — Medication 1000 MILLIGRAM(S): at 10:31

## 2022-04-23 RX ADMIN — HYDROMORPHONE HYDROCHLORIDE 0.5 MILLIGRAM(S): 2 INJECTION INTRAMUSCULAR; INTRAVENOUS; SUBCUTANEOUS at 16:49

## 2022-04-23 RX ADMIN — Medication 6 MICROGRAM(S)/KG/MIN: at 10:19

## 2022-04-23 RX ADMIN — SODIUM CHLORIDE 1000 MILLILITER(S): 9 INJECTION, SOLUTION INTRAVENOUS at 13:39

## 2022-04-23 RX ADMIN — Medication 400 MILLIGRAM(S): at 08:30

## 2022-04-23 RX ADMIN — Medication 25 GRAM(S): at 17:55

## 2022-04-23 NOTE — ED ADULT NURSE REASSESSMENT NOTE - NS ED NURSE REASSESS COMMENT FT1
BP decreased to 77/47, HR 86.  Dr. Willams called to bedside.   IV fluids and norepinephrine ordered.   Patient assessment otherwise unchanged.

## 2022-04-23 NOTE — PATIENT PROFILE ADULT - ARRIVAL FROM
1020  PT TO PRE OP TO ASSUME CARE. 1120 Patient allergies and NPO status verified, home medication reconciliation completed and belongings secured. Patient verbalizes understanding of pain scale, expected course of stay and plan of care. Surgical site verified with patient. IV access established. Sequentials placed AT BEDSIDE.     Nursing home

## 2022-04-23 NOTE — CONSULT NOTE ADULT - SUBJECTIVE AND OBJECTIVE BOX
Surgery Consult      Consulting surgical team: [ ] 1 - colorectal/surg onc   [ ] 2 - MIS/bariatrics   [x] 4 - acute care   [ ] 5 - general surgery/breast/pediatrics  Consulting attending: Dr. Velásquez      HPI: 81M [facility] colon cancer, BPH, HTN admitted from nursing home for AMS, fever to 102, sepsis of unknown origin with recent history of E. coli and Proteus UTI treated at [facility] on  completed course of abx.     General surgery consulted for evaluation of sacral decubitus ulcer as possible source of sepsis.     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 stage IV sacral decub with extensive necrotic tissue removed and WTD packing placed, R shoulder stage II decub  Extrem: WWP, no edema  Neuro: A/O x 0      LABS:                        8.5    20.88 )-----------( 137      ( 2022 08:45 )             28.5     04-    134<L>  |  103  |  49<H>  ----------------------------<  131<H>  4.7   |  21<L>  |  1.23    Ca    7.0<L>      2022 09:12    TPro  5.0<L>  /  Alb  1.7<L>  /  TBili  0.7  /  DBili  x   /  AST  202<H>  /  ALT  88<H>  /  AlkPhos  520<H>      Lactate: Lactate, Blood: 1.6 mmol/L ( @ 07:52)     PT/INR - ( 2022 07:52 )   PT: 23.7 sec;   INR: 1.98     PTT - ( 2022 07:52 )  PTT:33.7 sec    ABG - ( 2022 11:02 )  pH, Arterial: 7.36  pH, Blood: x     /  pCO2: 36    /  pO2: 184   / HCO3: 20    / Base Excess: -4.5  /  SaO2: 98.7        Urinalysis Basic - ( 2022 09:52 )  Color: Yellow / Appearance: Clear / S.025 / pH: x  Gluc: x / Ketone: NEGATIVE  / Bili: Negative / Urobili: 1.0 E.U./dL   Blood: x / Protein: 30 mg/dL / Nitrite: NEGATIVE   Leuk Esterase: Trace / RBC: > 10 /HPF / WBC 5-10 /HPF   Sq Epi: x / Non Sq Epi: 0-5 /HPF / Bacteria: Present /HPF      MICRO:   Urinalysis with Rflx Culture (22 @ 09:52)    Urine Appearance: Clear    Color: Yellow    Specific Gravity: 1.025    pH Urine: 5.5    Protein, Urine: 30 mg/dL    Glucose Qualitative, Urine: NEGATIVE    Ketone - Urine: NEGATIVE    Blood, Urine: Large    Bilirubin: Negative    Urobilinogen: 1.0 E.U./dL    Leukocyte Esterase Concentration: Trace    Nitrite: NEGATIVE        IMAGING:  CT A/P pending

## 2022-04-23 NOTE — H&P ADULT - HISTORY OF PRESENT ILLNESS
This is a 82 y/o M with PMHx of colon CA, BPH urinary retention with cam (recently replaced 4/13 at  ED), HTN who presents from nursing home for altered mental status, fever, and sepsis.  As per transfer notes, pt had temp of 101.7 with BP in 70s this morning and was less responsive than usual. Pt recently treated with E.Coli UTI dx at [facility] on 4/13 sensitive to cefpodoxime (completed course). Unable to obtain history from patient as pt is altered and septic.  Pt accompanied by MOLST confirming DNR/DNI.  Emergency contact is legal guardian Brittany Bocanegra [phone] - attempted to be contacted but went to  and  full.  Pt required immediate attention upon arrival to ED secondary to hypotension and AMS. ICU and surgery was consulted.    ED Course  Vitals: 97.7F, 77-87, ~90/50, 93% on 3 L NC  Labs: WBC 20.88 -> 15.43, Hgb 8.5 ->8.2, Hct 28.5 -> 26.8, Plts 137-126, PT 23.7, INR 1.98, Na 134, HCO3 21, BUN 49, Creatinine 1.23, Gluc 131, lactate 3.3, albumin 1.7, Alk Phos 520, , ALT 88; pH 7.36, pCO2 36, pO2 184, urine WBC 5-0, RBC >10, bacteria present  Imaging: CXR - no acute infiltrates  EKG: , QTc 473, no signs of ischemia/infarcts  Interventions: Tylenol 1g, cefepime 1 g x1, vanc 1 g x1, levo gtt, NS 1 L boluses x3, LR 1 L boluses x2

## 2022-04-23 NOTE — ED ADULT NURSE NOTE - OBJECTIVE STATEMENT
Patient sent from NH with hypotension, , patient is responsive to painful stimuli only, patient DNR DNI.  Unstage able sacral ulcers noted.

## 2022-04-23 NOTE — CONSULT NOTE ADULT - NSCONSULTADDITIONALINFOA_GEN_ALL_CORE
SENIOR RESIDENT ADDENDUM  Agree with above. 81M admitted from nursing home for altered mental status. Found to be febrile to 102, BP 70s, required levophed. Recently treated for UTI with cefpodoxime. DNR/DNI. No fever here, 36.5 in the ER, HR normal, hypotensive on pressors. [id number] sacral deep pressure injury, stage 4, with overlying necrotic tissue and foul smell, sharply debrided, no purulence, no fluctuance, no surrounding skin erythema, no tracking. No other areas of injury in the buttocks or perineum. Smaller 5x5cm deep pressure injury on left shoulder, stage 2, no necrotic tissue, no foul smell, no erythema, no induration, no fluctuance. WBC 20. Hgb 5.7. Albumin 1.7. UA positive. Sepsis likely urosepsis. Deep pressure injury necrotic but not infected. Severely malnourished. Recommend wound care consult. Will continue wet to dry dressings for now. Discussed with attending surgeon. Surgery Team 4C will continue to follow. Please page Team 4 with questions/clinical changes. [phone]

## 2022-04-23 NOTE — H&P ADULT - NSICDXPASTMEDICALHX_GEN_ALL_CORE_FT
PAST MEDICAL HISTORY:  Colon cancer     History of BPH     HTN (hypertension)     Urinary retention

## 2022-04-23 NOTE — PATIENT PROFILE ADULT - FALL HARM RISK - HARM RISK INTERVENTIONS

## 2022-04-23 NOTE — ED PROVIDER NOTE - CLINICAL SUMMARY MEDICAL DECISION MAKING FREE TEXT BOX
Pt from NH with ams - presenting with sepsis: temp 101.7 and hypotensive with BP 70s - required my immediate attention, opens eyes to voice - tachycardic and hypotensive, full sepsis workup started.  US guided IV placed by myself - 3 L fluids, iv tylenol, cefepime and vancomycin started.  Emergency contact attempted to be contacted - vm full.  Will try again. Pt from NH with ams - presenting with sepsis: temp 101.7 and hypotensive with BP 70s - required my immediate attention, opens eyes to voice - tachycardic and hypotensive, full sepsis workup started.  US guided IV placed by myself - 3 L fluids, iv tylenol, cefepime and vancomycin started.  Emergency contact attempted to be contacted - vm full.  Will try again.    Pt with repeat BP 100s - then dropped again to 70s - levo started - low rate. ICU at bedside agrees on admission to ICU for severe sepsis.  Emergency contact called multiple times - vm full.

## 2022-04-23 NOTE — ED PROVIDER NOTE - OBJECTIVE STATEMENT
80 y/o f with PMH of colon ca, BPH urinary retention with cam (recently replaced 4/13 at  ED), HTN presents from nursing home for altered mental status, fever, and sepsis.  As per transfer notes, pt had temp of 101.7 with BP in 70s this morning and was less responsive than usual.  Pt recently treated with E.Coli UTI dx at [facility] on 4/13 sensitive to cefpodoxime (completed course). Unable to obtain history from patient as pt is altered and septic.  Pt accompanied by MOLST confirming DNR/DNI.  Emergency contact is legal guardian Brittany Bocanegra [phone] - attempted to be contacted but went to  and  full.  Pt required my immediate attention upon arrival to ED secondary to hypotension and ams.

## 2022-04-23 NOTE — H&P ADULT - ASSESSMENT
81 year old M w/ PMHx of colon CA with mets (on decadron 2 mg BID for pain and encorafenib 75 mg QD), urinary retention 2/2 BPH with chronic indwelling cam, UTI recently discharged from [facility] ED on 4/13 after cam replacement, HTN who presents from nursing home for AMS, fever, and hypotension. ICU consulted for septic shock.     NEURO  #Toxic Metabolic Encephalopathy  Pt meets sepsis (3/4 SIRS criteria); s/p 6 L IVF. Baseline AOx2-3 per NH records, currently nonverbal and moans to touch. Etiology most likely 2/2 to sepsis and dehydration due to acute change in mental status.   Plan:  - Q1hr neuro checks  - Sepsis plan as below   - NPO  - Aspiration precautions   - Tylenol PRN     CARDIOVASCULAR  #Septic Shock  - Currently on levo gtt and maintaining MAP >65. Unknown hx of HF. Per NH records, no hx of CAD. EKG shows NSR w/ no ischemia/infarcts. POCUS   Plan:   - Wean levo as titrated   - Consider TTE    PULMONARY   WU; pt on NC w/ no signs of iwob or distress and maintains oxygen saturation     GASTROINTESTINAL  #Transaminitis  - 2/2 to sepsis  - Cont to monitor   - NPO until improvement in mental status     #Hx of Crohn's disease and colon CA  - S/p R hemicolectomy and liver abalation (unknown date)    RENAL  #BRAN  BUN/Cr 31/0.90 -> 49/1.23; s/p 6 L IVF   - Cont monitor kidney function     ENDOCRINOLOGY  WU  - mISS    HEME  #Anemia  Most likely 2/2 AOCD from Crohn's disease and colon CA.   - Transfuse Hgb <7  - Cont to monitor    ID  #Septic Shock  Patient presenting with SBP in 70's, tachycardic, with leukocytosis (end organ damage including BRAN and transmanitis). Source of shock could be 2/2 to uti (last visit was growing e coli and proteus, unclear whether patient completed course of PO cefpodoxime upon discharge) vs sacral wounds. physical exam revealing altered mental status (patient arousable and able to nod in response to questions), cool upper extermities with dusky fingers, large necrotic sacral wounds ( [id number] sacral deep pressure injury, stage 4, and smaller 5x5 cm ulcer). s/p 4 L crystalloid solution, vanc, and cefepime in ED. Bedside US with A lines throughout, no overt reduction in cardiac fx, with very collapsable IVC.  - C/w ceftriaxone for UTI coverage (both e coli and proteus were sensitive on previous admission) and c/w vancomycin for soft tissue infx in setting of necrotic sacral ulcers  - Wean levophed as tolerated (goal map 65)  - Patient on decadron 2 mg BID for pain 2/2 to colon ca with mets. in setting of shock will do stress dose steroids (load with 100 mg followed by 50 mg q6)  - Follow up Blood Cultures   - Follow up Urine Cultures   - Aspiration Precautions   - NPO until mental status improves    DERM  #Sacral Wounds  - Pt has large necrotic sacral wounds (see above)   - C/w vanc for soft tissue infection  - Consult wound care, appreciate recs   - Consulted surgery, appreciate recs    PROPHYLAXIS  F: s/p 6 L IVF  E: replete PRN  N: NPO  Lines: 2 peripheral IVs and cam (prior to admission)   Dispo: MICU
stated

## 2022-04-23 NOTE — CONSULT NOTE ADULT - ASSESSMENT
81M PMH colon cancer, BPH, HTN admitted from nursing home for AMS, fever to 102, sepsis of unknown origin with recent history of E. coli and Proteus UTI treated at [facility] on 4/13 completed course of abx.     Wound care  Will change dressing tomorrow  Pressure offloading  Nutritional optimization  Seen and examined in room  Debrided necrotic tissue bedside  Team 4 surgery   Discussed with attending Dr. Velásquez

## 2022-04-23 NOTE — H&P ADULT - NSHPPHYSICALEXAM_GEN_ALL_CORE
ICU Vital Signs Last 24 Hrs  T(C): 35.6 (23 Apr 2022 14:26), Max: 36.5 (23 Apr 2022 10:14)  T(F): 96.1 (23 Apr 2022 14:26), Max: 97.7 (23 Apr 2022 10:14)  HR: 99 (23 Apr 2022 14:02) (76 - 114)  BP: 113/75 (23 Apr 2022 14:02) (73/42 - 113/75)  BP(mean): 88 (23 Apr 2022 14:02) (70 - 88)  ABP: --  ABP(mean): --  RR: 18 (23 Apr 2022 14:02) (16 - 20)  SpO2: 97% (23 Apr 2022 14:02) (97% - 100%) on NC     I&O's Summary    23 Apr 2022 07:01  -  23 Apr 2022 14:50  --------------------------------------------------------  IN: 1024 mL / OUT: 175 mL / NET: 849 mL    Physical Exam  General: A&Ox0 will open eyes to name but will not speak; cachetic appearing   Head: Bitemporal Wasting; MM dry  Neck: No JVD  Respiratory: Diminished breathe sounds  Cardiovascular: Tachycardia, S1/S2, no murmurs/rubs/gallops   Gastrointestinal: Soft; NTND; normoactive BS  Extremities: Right hand is cool to touch, Left hand is warm and LE are warm bilaterally   Neurological: Patient does not follow any commands but opening eyes

## 2022-04-23 NOTE — ED PROVIDER NOTE - EKG ADDITIONAL QUESTION - PERFORMED INDEPENDENT VISUALIZATION
Patient called stating that she was on amitriptyline to help her sleep, but has not been on it for a while.  She stated that she is still not sleeping and she has lots of issues going on with her currently (patient stated that she may need several surgeries, has spent lots of money seeing lots of different doctors with no answers, has severe tinnitus, lost her favorite cat a year ago today and daniel about losing her every day).  She is wondering if a medication can be prescribed to help her sleep.  Her pharmacy is [facility] in [place].  Please call at [phone].   Yes

## 2022-04-23 NOTE — GOALS OF CARE CONVERSATION - ADVANCED CARE PLANNING - CONVERSATION DETAILS
Spoke with HCP June Berger (phone # [phone]) in regards to patients [facility]. It was confirmed patient is DNR DNI. It was explained to HCP that patient was severely septic with low blood pressures requiring pressor (levophed) to help keep BP elevated. Discussed moving forward in order to help keep pressures elevated, more agents would be required. This would entail more invasive procedures such as a central line and arterial line. It was explained these procedures come with their own risk such as pain to the patient, blood stream infections, or pneumothorax. Given his overall condition and his prior healthcare goals, it was decided that the patient would not want anymore invasive procedures and would remain capped at one pressor.

## 2022-04-23 NOTE — ED ADULT NURSE NOTE - SEPSIS REFERENCE DATA CRITERIA 1
After Visit Summary   4/27/2017    Shanell Roman    MRN: [id number]           Patient Information     Date Of Birth          1982        Visit Information        Provider Department      4/27/2017 1:45 PM Agbeh, Cephas Mawuena, MD [facility]        Today's Diagnoses     Dysmenorrhea    -  1    Menorrhagia with regular cycle        Encounter for preconception consultation          Care Instructions                                                           If you have any questions regarding your visit, Please contact your care team.    Women s Health CLINIC HOURS TELEPHONE NUMBER   Cephas Agbeh, M.D.    Romelia Wang- ALEKS Shaw - RN    Millie -         Monday-[facility]  8:00 am - 5 pm  Tuesday- [facility]  8:00am- 5 pm  Wednesday- Off  Thursday- [facility]  8:00 am- 5 pm  Friday-[place]  8:00 am 5 pm [facility]  [address] [place] [address]  [phone] ask for Women[facility] Clinic    Imaging [phone]    [facility]  [address] [place] [address]  [phone]  Imaging [phone]     Urgent Care locations:    [facility] Saturday and Sunday   9 am - 5 pm    Monday-Friday   12 pm - 8 pm  Saturday and Sunday   9 am - 5 pm   [phone]       If you need a medication refill, please contact your pharmacy. Please allow 3 business days for your refill to be completed.  As always, Thank you for trusting us with your healthcare needs!               Follow-ups after your visit        Your next 10 appointments already scheduled     May 03, 2017  8:00 AM CDT   US PELVIC COMPLETE W TRANSVAGINAL with BEUS1   [facility] ([facility])    [address] [id number] [phone]           Please bring a list of your medicines (including vitamins, minerals and over-the-counter drugs). Also, tell your  doctor about any allergies you may have. Wear comfortable clothes and leave your valuables at home.  Adults: Drink six 8-ounce glasses of fluid one hour before your exam. Do NOT empty your bladder.  If you need to empty your bladder before your exam, try to release only a little bit of urine. Then, drink another 8oz glass of fluid.  Children: Children who are potty trained should drink at least 4 cups (32 oz) of liquid 45 minutes to one hour prior to the exam. The child s bladder must be full in order to achieve a diagnostic exam. If your child is very uncomfortable or has an urgent need to pee, please notify a technologist; they will try to find out how much longer the wait may be and provide instructions to help relieve the pressure. Occasionally it is medically necessary to insert a urinary catheter to fill the bladder.  Please call the Imaging Department at your exam site with any questions.            May 03, 2017  9:15 AM CDT   LAB with BE LAB   [facility] [place] ([facility])    [address] [id number] [phone]           Patient must bring picture ID.  Patient should be prepared to give a urine specimen  Please do not eat 10-12 hours before your appointment if you are coming in fasting for labs on lipids, cholesterol, or glucose (sugar).  Pregnant women should follow their Care Team instructions. Water with medications is okay. Do not drink coffee or other fluids.   If you have concerns about taking  your medications, please ask at office or if scheduling via [employer], send a message by clicking on Secure Messaging, Message Your Care Team.              Future tests that were ordered for you today     Open Future Orders        Priority Expected Expires Ordered    Lutropin Routine 5/27/2017 4/27/2018 4/27/2017    Follicle stimulating hormone Routine 5/27/2017 4/27/2018 4/27/2017    Progesterone Routine 5/27/2017 4/27/2018 4/27/2017    Estradiol Routine 5/27/2017  "4/27/2018 4/27/2017    Testosterone Free and Total Routine 5/27/2017 4/27/2018 4/27/2017    DHEA sulfate Routine 5/27/2017 4/27/2018 4/27/2017    Prolactin Routine 5/27/2017 4/27/2018 4/27/2017    Comprehensive metabolic panel Routine 5/27/2017 4/27/2018 4/27/2017    US Pelvic Complete w Transvaginal Routine 7/26/2017 4/27/2018 4/27/2017            Who to contact     If you have questions or need follow up information about today's clinic visit or your schedule please contact [facility] directly at [phone].  Normal or non-critical lab and imaging results will be communicated to you by [employer], letter or phone within 4 business days after the clinic has received the results. If you do not hear from us within 7 days, please contact the clinic through [employer] or phone. If you have a critical or abnormal lab result, we will notify you by phone as soon as possible.  Submit refill requests through Lydia or call your pharmacy and they will forward the refill request to us. Please allow 3 business days for your refill to be completed.          Additional Information About Your Visit        [employer] Information     Lydia gives you secure access to your electronic health record. If you see a primary care provider, you can also send messages to your care team and make appointments. If you have questions, please call your primary care clinic.  If you do not have a primary care provider, please call [phone] and they will assist you.        Care EveryWhere ID     This is your Care EveryWhere ID. This could be used by other organizations to access your [facility] medical records  [id number]        Your Vitals Were     Pulse Temperature Height Last Period Pulse Oximetry BMI (Body Mass Index)    73 96.5  F (35.8  C) (Tympanic) 5' 6.5\" (1.689 m) 04/16/2017 100% 27.19 kg/m2       Blood Pressure from Last 3 Encounters:   04/27/17 123/78   02/23/17 115/74   11/11/16 123/78    Weight from Last 3 Encounters: "   04/27/17 171 lb (77.6 kg)   02/23/17 174 lb (78.9 kg)   11/03/16 176 lb 3.2 oz (79.9 kg)                 Today's Medication Changes          These changes are accurate as of: 4/27/17  2:23 PM.  If you have any questions, ask your nurse or doctor.               Start taking these medicines.        Dose/Directions    folic acid 1 MG tablet   Commonly known as:  FOLVITE   Used for:  Dysmenorrhea, Menorrhagia with regular cycle, Encounter for preconception consultation   Started by:  Agbeh, Cephas Mawuena, MD        Dose:  1 mg   Take 1 tablet (1 mg) by mouth daily   Quantity:  100 tablet   Refills:  3            Where to get your medicines      These medications were sent to [facility]/pharmacy #[id number] - [place], [address] COR[address] OF [address], [place] [address]     Phone:  [phone]     folic acid 1 MG tablet                Primary Care Provider Office Phone #    [facility] [phone]       No address on file        Thank you!     Thank you for choosing [facility]  for your care. Our goal is always to provide you with excellent care. Hearing back from our patients is one way we can continue to improve our services. Please take a few minutes to complete the written survey that you may receive in the mail after your visit with us. Thank you!             Your Updated Medication List - Protect others around you: Learn how to safely use, store and throw away your medicines at www.disposemymeds.org.          This list is accurate as of: 4/27/17  2:23 PM.  Always use your most recent med list.                   Brand Name Dispense Instructions for use    buPROPion 150 MG 12 hr tablet    WELLBUTRIN SR    60 tablet    Take 1 tablet once daily and increase to 1 tablet twice daily after 4 to 7 days       cephALEXin 500 MG capsule    KEFLEX    20 capsule    Take 1 capsule (500 mg) by mouth 2 times daily       drospirenone-ethinyl estradiol 3-0.02 MG per  tablet    DIONY    84 tablet    Take 1 tablet by mouth daily       folic acid 1 MG tablet    FOLVITE    100 tablet    Take 1 tablet (1 mg) by mouth daily          Abormal VS: Temp > 100F or < 96.8F; SBP < 90 mmHG; HR > 120bpm; Resp > 24/min

## 2022-04-23 NOTE — CONSULT NOTE ADULT - ATTENDING COMMENTS
80 yo M w/ hx of colon ca, BPH c/b chronic urinary retention s/p cam, HTN, presents with AMS, fever, hypotension, leukocytosis w/ hx of + UA and E coli from 4/13, unclear if pt completed cefpodoxime course. On exam, opens eyes but unable to answer questions, appears severely cachectic, dry, with large dry necrotic sacral ulcer, no obvious purulence. Admit to MICU for septic shock 2/2 UTI, will also have surgery evaluate sacral decub although less likely source. Overall prognosis poor with significantly limited underlying functional status, currently DNR/DNI. Received 5L IVF, POCUS with a line pattern diffusely, hyperdynamic LV, no hydronephrosis. Plan for another 1L IVF and levophed to titrate to MAP >65, trend lactate, F/U UCx, BCx. GoC discussion with family.

## 2022-04-23 NOTE — CONSULT NOTE ADULT - SUBJECTIVE AND OBJECTIVE BOX
81year old male  History: History is taken from ED provider note.  Patient was unable to HPI or subjective due to current mental state.     82 y/o f with PMH of colon ca, BPH urinary retention with Christopher (recently replaced  at  ED), HTN presents from nursing home for altered mental status, fever, and sepsis.  As per transfer notes, pt had temp of 101.7 with BP in 70s this morning and was less responsive than usual.  Pt recently treated with E.Coli UTI dx at [facility] on  sensitive to cefpodoxime (completed course). Unable to obtain history from patient as pt is altered and septic.  Pt accompanied by [facility] confirming DNR/DNI.  Emergency contact is legal guardian Brittany Bocanegra [phone] - attempted to be contacted but went to  and  full.  Pt required my immediate attention upon arrival to ED secondary to hypotension and ams    Subjective/ROS: Unable to obtain.       VITALS  Vital Signs Last 24 Hrs  T(C): 36.5 (2022 10:14), Max: 36.5 (2022 10:14)  T(F): 97.7 (2022 10:14), Max: 97.7 (2022 10:14)  HR: 84 (2022 11:30) (76 - 114)  BP: 88/51 (2022 11:30) (73/42 - 104/51)  BP(mean): --  RR: 18 (2022 11:30) (16 - 20)  SpO2: 100% (2022 11:30) (100% - 100%)    CAPILLARY BLOOD GLUCOSE      POCT Blood Glucose.: 104 mg/dL (2022 09:37)      PHYSICAL EXAM  General: A&Ox0 Will open eyes to name but will not speak; cahcetic appearing with bilateral temporal wasting   Head: Bitemporal Wasting; Mucous Membranes Dry; Patient will track   Neck: No JVD  Respiratory: Diminished breathe sounds  Cardiovascular: Diffcult to auscultate however appeared to be tachycardic   Gastrointestinal: Soft; NTND; normoactive BS  Extremities: Right hand is cool to touch, Left hand is warm and LE are warm bilaterally   Neurological: Patient does not follow any commands but opening eyes    MEDICATIONS  (STANDING):  chlorhexidine 2% Cloths 1 Application(s) Topical <User Schedule>  enoxaparin Injectable 40 milliGRAM(s) SubCutaneous every 24 hours  hydrocortisone sodium succinate Injectable 100 milliGRAM(s) IV Push once  methylPREDNISolone sodium succinate Injectable 50 milliGRAM(s) IV Push every 6 hours  norepinephrine Infusion 0.05 MICROgram(s)/kG/Min (6 mL/Hr) IV Continuous <Continuous>    MEDICATIONS  (PRN):      penicillin (Unknown)  sulfamethoxazole (Unknown)      LABS                        8.5    20.88 )-----------( 137      ( 2022 08:45 )             28.5     04    134<L>  |  103  |  49<H>  ----------------------------<  131<H>  4.7   |  21<L>  |  1.23    Ca    7.0<L>      2022 09:12    TPro  5.0<L>  /  Alb  1.7<L>  /  TBili  0.7  /  DBili  x   /  AST  202<H>  /  ALT  88<H>  /  AlkPhos  520<H>  04-23    PT/INR - ( 2022 07:52 )   PT: 23.7 sec;   INR: 1.98          PTT - ( 2022 07:52 )  PTT:33.7 sec  Urinalysis Basic - ( 2022 09:52 )    Color: Yellow / Appearance: Clear / S.025 / pH: x  Gluc: x / Ketone: NEGATIVE  / Bili: Negative / Urobili: 1.0 E.U./dL   Blood: x / Protein: 30 mg/dL / Nitrite: NEGATIVE   Leuk Esterase: Trace / RBC: > 10 /HPF / WBC 5-10 /HPF   Sq Epi: x / Non Sq Epi: 0-5 /HPF / Bacteria: Present /HPF

## 2022-04-23 NOTE — ED ADULT TRIAGE NOTE - CHIEF COMPLAINT QUOTE
sent from [facility] for altered mental status and hypotension (70/40);  pt is DNR/DNI

## 2022-04-23 NOTE — H&P ADULT - NSHPLABSRESULTS_GEN_ALL_CORE
Labs                        8.2    15.43 )-----------( 126      ( 23 Apr 2022 14:23 )             26.8     04-23    136  |  106  |  43<H>  ----------------------------<  99  4.6   |  18<L>  |  0.91    Ca    7.4<L>      23 Apr 2022 14:26  Phos  3.7     04-23  Mg     1.5     04-23    TPro  5.0<L>  /  Alb  1.7<L>  /  TBili  0.7  /  DBili  x   /  AST  202<H>  /  ALT  88<H>  /  AlkPhos  520<H>  04-23    Lactate, Blood: 3.3  Creatine Kinase, Serum: 868 U/L  C-Reactive Protein, Serum: 196.2 mg/L    ABG - ( 23 Apr 2022 11:02 )  pH, Arterial: 7.36  pH, Blood: x     /  pCO2: 36    /  pO2: 184   / HCO3: 20    / Base Excess: -4.5  /  SaO2: 98.7        Radiology  < from: Xray Chest 1 View- PORTABLE-Urgent (Xray Chest 1 View- PORTABLE-Urgent .) (04.23.22 @ 08:17) >    IMPRESSION: No acute infiltrates    < end of copied text >

## 2022-04-23 NOTE — ED ADULT NURSE NOTE - CHIEF COMPLAINT QUOTE
sent from [facility] for altered mental status and hypotension (70/40);  pt is DNR/DNI

## 2022-04-23 NOTE — CONSULT NOTE ADULT - ASSESSMENT
Impression: 81 year old male with history most significant of urinary retention secondary to BPH with chronic indwelling Christopher, urinary tract infection recently discharged from [facility] ED after Christopher replacement, HTN presents from nursing home for altered mental status with fever -> found to be hypotensive in the ED status post 3L of fluid  -> physical exam revealing altered mental status, cool right extremity with BP of 71/51, elevated WBC and positive urinary studies now status post Cefepime and Vancomycin for presumed UTI.   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 ICU consulted for septic shock.     #Septic Shock  Patient presenting with SBP in 70's, tachycardic, with leukocytosis (end organ damage including BRAN and transmanitis). Source of shock could be 2/2 to uti (last visit was growing e coli and proteus, unclear whether patient completed course of PO cefpodoxime upon discharge) vs sacral wounds. physical exam revealing altered mental status (patient arousable and able to nod in response to questions), cool upper extermities with dusky fingers, large necrotic sacral wounds ( [id number] sacral deep pressure injury, stage 4, and smaller 5x5 cm ulcer). s/p 4 L crystalloid solution, vanc, and cefepime in ED. Bedside US with A lines throughout, no overt reduction in cardiac fx, with very collapsable IVC.  -c/w ceftriaxone for UTI coverage (both e coli and proteus were sensitive on previous admission) and c/w vancomycin for soft tissue infx in setting of necrotic sacral ulcers  -wean levophed as tolerated (goal map 65)  -patient on decadron 2 mg BID for pain 2/2 to colon ca with mets. in setting of shock will do stress dose steroids (load with 100 mg followed by 50 mg q6)  -Follow up Blood Cultures   -Follow up Urine Cultures   -Aspiration Precautions   -NPO until mental status improves    Dispo: [facility]  Case d/w Dr. Rascon

## 2022-04-24 LAB
-  CANDIDA ALBICANS: SIGNIFICANT CHANGE UP
-  CANDIDA GLABRATA: SIGNIFICANT CHANGE UP
-  CANDIDA KRUSEI: SIGNIFICANT CHANGE UP
-  CANDIDA PARAPSILOSIS: SIGNIFICANT CHANGE UP
-  CANDIDA TROPICALIS: SIGNIFICANT CHANGE UP
-  COAGULASE NEGATIVE STAPHYLOCOCCUS: SIGNIFICANT CHANGE UP
-  K. PNEUMONIAE GROUP: SIGNIFICANT CHANGE UP
-  STREPTOCOCCUS SP. (NOT GRP A, B OR S PNEUMONIAE): SIGNIFICANT CHANGE UP
A BAUMANNII DNA SPEC QL NAA+PROBE: SIGNIFICANT CHANGE UP
A1C WITH ESTIMATED AVERAGE GLUCOSE RESULT: 5.4 % — SIGNIFICANT CHANGE UP (ref 4–5.6)
ALBUMIN SERPL ELPH-MCNC: 2 G/DL — LOW (ref 3.3–5)
ALP SERPL-CCNC: 575 U/L — HIGH (ref 40–120)
ALT FLD-CCNC: 123 U/L — HIGH (ref 10–45)
ANION GAP SERPL CALC-SCNC: 11 MMOL/L — SIGNIFICANT CHANGE UP (ref 5–17)
ANISOCYTOSIS BLD QL: SLIGHT — SIGNIFICANT CHANGE UP
AST SERPL-CCNC: 195 U/L — HIGH (ref 10–40)
BASE EXCESS BLDA CALC-SCNC: -4.1 MMOL/L — LOW (ref -2–3)
BASOPHILS # BLD AUTO: 0 K/UL — SIGNIFICANT CHANGE UP (ref 0–0.2)
BASOPHILS NFR BLD AUTO: 0 % — SIGNIFICANT CHANGE UP (ref 0–2)
BILIRUB SERPL-MCNC: 0.5 MG/DL — SIGNIFICANT CHANGE UP (ref 0.2–1.2)
BUN SERPL-MCNC: 45 MG/DL — HIGH (ref 7–23)
BURR CELLS BLD QL SMEAR: PRESENT — SIGNIFICANT CHANGE UP
CALCIUM SERPL-MCNC: 7.9 MG/DL — LOW (ref 8.4–10.5)
CHLORIDE SERPL-SCNC: 105 MMOL/L — SIGNIFICANT CHANGE UP (ref 96–108)
CO2 BLDA-SCNC: 21 MMOL/L — SIGNIFICANT CHANGE UP (ref 19–24)
CO2 SERPL-SCNC: 19 MMOL/L — LOW (ref 22–31)
CREAT SERPL-MCNC: 0.86 MG/DL — SIGNIFICANT CHANGE UP (ref 0.5–1.3)
E CLOAC COMP DNA BLD POS QL NAA+PROBE: SIGNIFICANT CHANGE UP
E COLI DNA BLD POS QL NAA+NON-PROBE: SIGNIFICANT CHANGE UP
EGFR: 88 ML/MIN/1.73M2 — SIGNIFICANT CHANGE UP
ENTEROCOC DNA BLD POS QL NAA+NON-PROBE: SIGNIFICANT CHANGE UP
ENTEROCOC DNA BLD POS QL NAA+NON-PROBE: SIGNIFICANT CHANGE UP
EOSINOPHIL # BLD AUTO: 0 K/UL — SIGNIFICANT CHANGE UP (ref 0–0.5)
EOSINOPHIL NFR BLD AUTO: 0 % — SIGNIFICANT CHANGE UP (ref 0–6)
ESTIMATED AVERAGE GLUCOSE: 108 MG/DL — SIGNIFICANT CHANGE UP (ref 68–114)
GIANT PLATELETS BLD QL SMEAR: PRESENT — SIGNIFICANT CHANGE UP
GLUCOSE BLDC GLUCOMTR-MCNC: 112 MG/DL — HIGH (ref 70–99)
GLUCOSE BLDC GLUCOMTR-MCNC: 122 MG/DL — HIGH (ref 70–99)
GLUCOSE BLDC GLUCOMTR-MCNC: 140 MG/DL — HIGH (ref 70–99)
GLUCOSE BLDC GLUCOMTR-MCNC: 142 MG/DL — HIGH (ref 70–99)
GLUCOSE SERPL-MCNC: 143 MG/DL — HIGH (ref 70–99)
GP B STREP DNA BLD POS QL NAA+NON-PROBE: SIGNIFICANT CHANGE UP
GRAM STN FLD: SIGNIFICANT CHANGE UP
GRAM STN FLD: SIGNIFICANT CHANGE UP
HAEM INFLU DNA BLD POS QL NAA+NON-PROBE: SIGNIFICANT CHANGE UP
HCO3 BLDA-SCNC: 20 MMOL/L — LOW (ref 21–28)
HCT VFR BLD CALC: 30.8 % — LOW (ref 39–50)
HGB BLD-MCNC: 9.1 G/DL — LOW (ref 13–17)
K OXYTOCA DNA BLD POS QL NAA+NON-PROBE: SIGNIFICANT CHANGE UP
L MONOCYTOG DNA BLD POS QL NAA+NON-PROBE: SIGNIFICANT CHANGE UP
LYMPHOCYTES # BLD AUTO: 0 % — LOW (ref 13–44)
LYMPHOCYTES # BLD AUTO: 0 K/UL — LOW (ref 1–3.3)
MAGNESIUM SERPL-MCNC: 2.4 MG/DL — SIGNIFICANT CHANGE UP (ref 1.6–2.6)
MANUAL SMEAR VERIFICATION: SIGNIFICANT CHANGE UP
MCHC RBC-ENTMCNC: 23.7 PG — LOW (ref 27–34)
MCHC RBC-ENTMCNC: 29.5 GM/DL — LOW (ref 32–36)
MCV RBC AUTO: 80.2 FL — SIGNIFICANT CHANGE UP (ref 80–100)
METHOD TYPE: SIGNIFICANT CHANGE UP
MONOCYTES # BLD AUTO: 0.64 K/UL — SIGNIFICANT CHANGE UP (ref 0–0.9)
MONOCYTES NFR BLD AUTO: 1.7 % — LOW (ref 2–14)
MRSA SPEC QL CULT: SIGNIFICANT CHANGE UP
MSSA DNA SPEC QL NAA+PROBE: SIGNIFICANT CHANGE UP
N MEN ISLT CULT: SIGNIFICANT CHANGE UP
NEUTROPHILS # BLD AUTO: 37.29 K/UL — HIGH (ref 1.8–7.4)
NEUTROPHILS NFR BLD AUTO: 98.3 % — HIGH (ref 43–77)
P AERUGINOSA DNA BLD POS NAA+NON-PROBE: SIGNIFICANT CHANGE UP
PCO2 BLDA: 33 MMHG — LOW (ref 35–48)
PH BLDA: 7.39 — SIGNIFICANT CHANGE UP (ref 7.35–7.45)
PHOSPHATE SERPL-MCNC: 4.9 MG/DL — HIGH (ref 2.5–4.5)
PLAT MORPH BLD: NORMAL — SIGNIFICANT CHANGE UP
PLATELET # BLD AUTO: 138 K/UL — LOW (ref 150–400)
PO2 BLDA: 108 MMHG — SIGNIFICANT CHANGE UP (ref 83–108)
POIKILOCYTOSIS BLD QL AUTO: SLIGHT — SIGNIFICANT CHANGE UP
POTASSIUM SERPL-MCNC: 4.9 MMOL/L — SIGNIFICANT CHANGE UP (ref 3.5–5.3)
POTASSIUM SERPL-SCNC: 4.9 MMOL/L — SIGNIFICANT CHANGE UP (ref 3.5–5.3)
PROT SERPL-MCNC: 5.8 G/DL — LOW (ref 6–8.3)
PROTEUS SP DNA BLD POS QL NAA+NON-PROBE: SIGNIFICANT CHANGE UP
RBC # BLD: 3.84 M/UL — LOW (ref 4.2–5.8)
RBC # FLD: 16.8 % — HIGH (ref 10.3–14.5)
RBC BLD AUTO: ABNORMAL
S MARCESCENS DNA BLD POS NAA+NON-PROBE: SIGNIFICANT CHANGE UP
S PNEUM DNA BLD POS QL NAA+NON-PROBE: SIGNIFICANT CHANGE UP
S PYO DNA BLD POS QL NAA+NON-PROBE: SIGNIFICANT CHANGE UP
SAO2 % BLDA: 98.3 % — HIGH (ref 94–98)
SCHISTOCYTES BLD QL AUTO: SLIGHT — SIGNIFICANT CHANGE UP
SODIUM SERPL-SCNC: 135 MMOL/L — SIGNIFICANT CHANGE UP (ref 135–145)
VANCOMYCIN TROUGH SERPL-MCNC: 25.3 UG/ML — CRITICAL HIGH (ref 10–20)
WBC # BLD: 37.93 K/UL — HIGH (ref 3.8–10.5)
WBC # FLD AUTO: 37.93 K/UL — HIGH (ref 3.8–10.5)

## 2022-04-24 PROCEDURE — 99291 CRITICAL CARE FIRST HOUR: CPT

## 2022-04-24 RX ORDER — HYDROMORPHONE HYDROCHLORIDE 2 MG/ML
0.25 INJECTION INTRAMUSCULAR; INTRAVENOUS; SUBCUTANEOUS EVERY 4 HOURS
Refills: 0 | Status: DISCONTINUED | OUTPATIENT
Start: 2022-04-24 | End: 2022-04-25

## 2022-04-24 RX ORDER — CEFTRIAXONE 500 MG/1
2000 INJECTION, POWDER, FOR SOLUTION INTRAMUSCULAR; INTRAVENOUS EVERY 24 HOURS
Refills: 0 | Status: DISCONTINUED | OUTPATIENT
Start: 2022-04-25 | End: 2022-04-25

## 2022-04-24 RX ORDER — CEFTRIAXONE 500 MG/1
1000 INJECTION, POWDER, FOR SOLUTION INTRAMUSCULAR; INTRAVENOUS ONCE
Refills: 0 | Status: COMPLETED | OUTPATIENT
Start: 2022-04-24 | End: 2022-04-24

## 2022-04-24 RX ORDER — SODIUM CHLORIDE 9 MG/ML
1000 INJECTION, SOLUTION INTRAVENOUS
Refills: 0 | Status: DISCONTINUED | OUTPATIENT
Start: 2022-04-24 | End: 2022-04-25

## 2022-04-24 RX ADMIN — CEFTRIAXONE 100 MILLIGRAM(S): 500 INJECTION, POWDER, FOR SOLUTION INTRAMUSCULAR; INTRAVENOUS at 10:54

## 2022-04-24 RX ADMIN — Medication 166.67 MILLIGRAM(S): at 07:31

## 2022-04-24 RX ADMIN — Medication 50 MILLIGRAM(S): at 19:56

## 2022-04-24 RX ADMIN — Medication 50 MILLIGRAM(S): at 06:08

## 2022-04-24 RX ADMIN — HYDROMORPHONE HYDROCHLORIDE 0.5 MILLIGRAM(S): 2 INJECTION INTRAMUSCULAR; INTRAVENOUS; SUBCUTANEOUS at 11:13

## 2022-04-24 RX ADMIN — Medication 0.5 MILLIGRAM(S): at 02:14

## 2022-04-24 RX ADMIN — Medication 50 MILLIGRAM(S): at 01:36

## 2022-04-24 RX ADMIN — HYDROMORPHONE HYDROCHLORIDE 0.5 MILLIGRAM(S): 2 INJECTION INTRAMUSCULAR; INTRAVENOUS; SUBCUTANEOUS at 01:33

## 2022-04-24 RX ADMIN — HYDROMORPHONE HYDROCHLORIDE 0.5 MILLIGRAM(S): 2 INJECTION INTRAMUSCULAR; INTRAVENOUS; SUBCUTANEOUS at 01:47

## 2022-04-24 RX ADMIN — HYDROMORPHONE HYDROCHLORIDE 0.5 MILLIGRAM(S): 2 INJECTION INTRAMUSCULAR; INTRAVENOUS; SUBCUTANEOUS at 02:17

## 2022-04-24 RX ADMIN — CHLORHEXIDINE GLUCONATE 1 APPLICATION(S): 213 SOLUTION TOPICAL at 06:31

## 2022-04-24 RX ADMIN — Medication 6 MICROGRAM(S)/KG/MIN: at 05:03

## 2022-04-24 RX ADMIN — Medication 50 MILLIGRAM(S): at 11:49

## 2022-04-24 RX ADMIN — CEFTRIAXONE 100 MILLIGRAM(S): 500 INJECTION, POWDER, FOR SOLUTION INTRAMUSCULAR; INTRAVENOUS at 07:09

## 2022-04-24 RX ADMIN — ENOXAPARIN SODIUM 40 MILLIGRAM(S): 100 INJECTION SUBCUTANEOUS at 13:01

## 2022-04-24 RX ADMIN — PANTOPRAZOLE SODIUM 40 MILLIGRAM(S): 20 TABLET, DELAYED RELEASE ORAL at 06:08

## 2022-04-24 RX ADMIN — SODIUM CHLORIDE 100 MILLILITER(S): 9 INJECTION, SOLUTION INTRAVENOUS at 11:49

## 2022-04-24 RX ADMIN — HYDROMORPHONE HYDROCHLORIDE 0.5 MILLIGRAM(S): 2 INJECTION INTRAMUSCULAR; INTRAVENOUS; SUBCUTANEOUS at 12:00

## 2022-04-24 NOTE — DIETITIAN INITIAL EVALUATION ADULT - PERTINENT LABORATORY DATA
04-24    135  |  105  |  45<H>  ----------------------------<  143<H>  4.9   |  19<L>  |  0.86    Ca    7.9<L>      24 Apr 2022 06:05  Phos  4.9     04-24  Mg     2.4     04-24    TPro  5.8<L>  /  Alb  2.0<L>  /  TBili  0.5  /  DBili  x   /  AST  195<H>  /  ALT  123<H>  /  AlkPhos  575<H>  04-24  POCT Blood Glucose.: 140 mg/dL (04-24-22 @ 11:07)  A1C with Estimated Average Glucose Result: 5.4 % (04-24-22 @ 06:05)

## 2022-04-24 NOTE — PROGRESS NOTE ADULT - ASSESSMENT
81M PMH colon cancer, BPH, HTN admitted from nursing home for AMS, fever to 102, sepsis of unknown origin with recent history of E. coli and Proteus UTI treated at [facility] on 4/13 completed course of abx. Pt underwent sacral decub IV ulcer debridement on 4/23 without purulence or fluctuance. Stage II L shoulder ulcer w/o purulence or fluctuance. Ulcers unlikely to be source of sepsis and elevated WBC. In the setting of elevated LFTs and pained response to RUQ palpation on exam, possible gallbladder pathology could be considered if lacking source.    Wound care with WtD packing  Pressure offloading  Nutritional optimization  Seen and examined w/ attending surgeon  Team 4c will continue to follow  Rest of plan per primary

## 2022-04-24 NOTE — PROGRESS NOTE ADULT - SUBJECTIVE AND OBJECTIVE BOX
SUBJECTIVE: Pt is improved. Weaning pressors and more responsive to pain than previous.      MEDICATIONS  (STANDING):  chlorhexidine 2% Cloths 1 Application(s) Topical <User Schedule>  dextrose 5%. 1000 milliLiter(s) (50 mL/Hr) IV Continuous <Continuous>  dextrose 5%. 1000 milliLiter(s) (100 mL/Hr) IV Continuous <Continuous>  dextrose 50% Injectable 25 Gram(s) IV Push once  dextrose 50% Injectable 12.5 Gram(s) IV Push once  dextrose 50% Injectable 25 Gram(s) IV Push once  enoxaparin Injectable 40 milliGRAM(s) SubCutaneous every 24 hours  glucagon  Injectable 1 milliGRAM(s) IntraMuscular once  insulin lispro (ADMELOG) corrective regimen sliding scale   SubCutaneous three times a day before meals  lactated ringers. 1000 milliLiter(s) (100 mL/Hr) IV Continuous <Continuous>  methylPREDNISolone sodium succinate Injectable 50 milliGRAM(s) IV Push every 8 hours  norepinephrine Infusion 0.05 MICROgram(s)/kG/Min (6 mL/Hr) IV Continuous <Continuous>  pantoprazole    Tablet 40 milliGRAM(s) Oral before breakfast  polyethylene glycol 3350 17 Gram(s) Oral daily    MEDICATIONS  (PRN):  dextrose Oral Gel 15 Gram(s) Oral once PRN Blood Glucose LESS THAN 70 milliGRAM(s)/deciliter  HYDROmorphone  Injectable 0.25 milliGRAM(s) IV Push every 4 hours PRN Mild Pain (1 - 3)      Vital Signs Last 24 Hrs  T(C): 36 (2022 14:13), Max: 37.1 (2022 17:36)  T(F): 96.8 (2022 14:13), Max: 98.8 (2022 17:36)  HR: 92 (2022 16:00) (89 - 105)  BP: 99/55 (2022 16:00) (83/50 - 124/64)  BP(mean): 73 (2022 16:00) (62 - 90)  RR: 18 (2022 16:00) (10 - 30)  SpO2: 99% (2022 16:00) (93% - 100%)    Physical Exam:  General: NAD, resting comfortably in bed  Pulmonary: Nonlabored breathing, no respiratory distress  Cardiovascular: NSR  Abdominal: soft, mild RUQ TTP, ND  Back: Stage II L shoulder ulcer, 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 but no pus or other sign of infection  Extremities: WWP, normal strength  Neuro: A/O x 3, CNs II-XII grossly intact, no focal deficits    I&O's Summary    2022 07:01  -  2022 07:00  --------------------------------------------------------  IN: 1853.4 mL / OUT: 1020 mL / NET: 833.4 mL    2022 07:01  -  2022 16:50  --------------------------------------------------------  IN: 831.4 mL / OUT: 285 mL / NET: 546.4 mL        LABS:                        9.1    37.93 )-----------( 138      ( 2022 06:05 )             30.8     0424    135  |  105  |  45<H>  ----------------------------<  143<H>  4.9   |  19<L>  |  0.86    Ca    7.9<L>      2022 06:05  Phos  4.9     24  Mg     2.4     24    TPro  5.8<L>  /  Alb  2.0<L>  /  TBili  0.5  /  DBili  x   /  AST  195<H>  /  ALT  123<H>  /  AlkPhos  575<H>  24    PT/INR - ( 2022 07:52 )   PT: 23.7 sec;   INR: 1.98          PTT - ( 2022 07:52 )  PTT:33.7 sec  Urinalysis Basic - ( 2022 09:52 )    Color: Yellow / Appearance: Clear / S.025 / pH: x  Gluc: x / Ketone: NEGATIVE  / Bili: Negative / Urobili: 1.0 E.U./dL   Blood: x / Protein: 30 mg/dL / Nitrite: NEGATIVE   Leuk Esterase: Trace / RBC: > 10 /HPF / WBC 5-10 /HPF   Sq Epi: x / Non Sq Epi: 0-5 /HPF / Bacteria: Present /HPF      CAPILLARY BLOOD GLUCOSE      POCT Blood Glucose.: 122 mg/dL (2022 16:12)  POCT Blood Glucose.: 140 mg/dL (2022 11:07)  POCT Blood Glucose.: 142 mg/dL (2022 06:28)  POCT Blood Glucose.: 95 mg/dL (2022 16:59)    LIVER FUNCTIONS - ( 2022 06:05 )  Alb: 2.0 g/dL / Pro: 5.8 g/dL / ALK PHOS: 575 U/L / ALT: 123 U/L / AST: 195 U/L / GGT: x             RADIOLOGY & ADDITIONAL STUDIES:

## 2022-04-24 NOTE — DIETITIAN INITIAL EVALUATION ADULT - OTHER INFO
This is a 80 y/o M with PMHx of colon CA, BPH urinary retention with cam (recently replaced 4/13 at  ED), HTN who presents from nursing home for altered mental status, fever, and sepsis.  This afternoon due to lethargy diet advancement deferred.No N/V/D/C/or BM reported.SKin with unstageable sacrum,scapula,left heel stage 1, and DTI's. Noted goals of care..Per present weights patient is 88% of IBW.Present weight:64kg,IBW:72.7gm,Noted weight from 2013:66.7kg(7lb + weight loss over 9year not signficant.RD to follow up with GOC and potential diet advancement with bedside and or formal SLP evaluation.

## 2022-04-24 NOTE — PROGRESS NOTE ADULT - ATTENDING COMMENTS
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 less likely osteo of sacral decub. Improving on current antibiotics, but in setting of bacteremia will increase ceftriaxone to 2g IV daily. GOC discussion yesterday confirm DNR/DNI, became increasingly hypotensive after I&D of sacral decub by surgery for which family was approached re: central line and increased pressors, see GOC note. However, hemodynamics are now improving, titrating off levo. Elevated ESR/CRP, will d/w surgery if wound is probing to bone, hold off on more invasive imaging studies for now. Mental status had improved overnight but again somnolent, more likely related to dilaudid + ativan given for pain/agitation due to sacral wound. Will avoid benzo for agitation and focus on pain. Need to keep NPO for somnolence, start LR @ 100cc/hr until able to start diet.

## 2022-04-24 NOTE — DIETITIAN INITIAL EVALUATION ADULT - NSFNSPHYEXAMSKINFT_GEN_A_CORE
Pressure Injury 1: Left:,Hip, Suspected deep tissue injury  Pressure Injury 2: Left:,scapula , Unstageable  Pressure Injury 3: sacrum, Unstageable  Pressure Injury 4: Left:,buttocks, Unstageable  Pressure Injury 5: Right:,heel, Suspected deep tissue injury  Pressure Injury 6: Left:,heel, Stage I  Pressure Injury 7: none, none  Pressure Injury 8: none, none  Pressure Injury 9: none, none  Pressure Injury 10: none, none  Pressure Injury 11: none, none

## 2022-04-24 NOTE — PROGRESS NOTE ADULT - ASSESSMENT
81 year old M w/ PMHx of colon CA with mets (on decadron 2 mg BID for pain and encorafenib 75 mg QD), urinary retention 2/2 BPH with chronic indwelling cam, UTI recently discharged from [facility] ED on 4/13 after cam replacement, HTN who presents from nursing home for AMS, fever, and hypotension. ICU consulted for septic shock.     NEURO  #Toxic Metabolic Encephalopathy  Pt meets sepsis (3/4 SIRS criteria); s/p 6 L IVF. Baseline AOx2-3 per NH records, currently awake alert, not following commands but reports when in pain. Etiology most likely 2/2 to sepsis and dehydration due to acute change in mental status.   - Treat sepsis as below   - NPO pending improvement in mental status  - Aspiration precautions   - Tylenol PRN     CARDIOVASCULAR  # Shock  Patient in septic shock 2/2 gram negative bacteremia with underlying source UTI vs sacral ulcer, currently on levo gtt and maintaining MAP >65. Unknown hx of HF. Per NH records, no hx of CAD. EKG shows NSR w/ no ischemia/infarcts.   - Wean levo as titrated   - Consider TTE    PULMONARY   No acute issues, patient on room air, no signs or symptoms of increased work of breathing, and maintains oxygen saturation     GASTROINTESTINAL  #Transaminitis  Etiology of transaminitis likely septic shock  - Continue to monitor     #Hx of Crohn's disease and colon CA  S/p R hemicolectomy and liver abalation (unknown date)  - No acute interventions     RENAL  #BRAN  Patient with non-oliguric BRAN, BUN/Cr 31/0.90 -> 49/1.23 -> 43/0.91, etiology likely pre-renal in setting of septic shock, s/p 6 L IVF, overall BRAN stable, pt not oliguric.   - Continue fluids and monitor kidney function.      ENDOCRINOLOGY  WU  - mISS    HEME  #Anemia  Most likely 2/2 AOCD from Crohn's disease and colon CA.   - Transfuse Hgb <7  - Cont to monitor    ID  #Septic Shock  Patient presenting with SBP in 70's, tachycardic, with leukocytosis (end organ damage including BRAN and transmanitis). Source of shock could be 2/2 to uti (last visit was growing e coli and proteus, unclear whether patient completed course of PO cefpodoxime upon discharge) vs sacral wounds. On admission patient encephalopathic, cool upper extremities with dusky fingers, large necrotic sacral wounds ( [id number] sacral deep pressure injury, stage 4, and smaller 5x5 cm ulcer). S/p 4 L crystalloid solution, vanc, and cefepime in ED. Bedside US with A lines throughout, no overt reduction in cardiac fx, with very collapsable IVC.  Overall mental status improving, lactate negative.    - C/w ceftriaxone 2g q12 for UTI coverage (both e coli and proteus were sensitive on previous admission) and c/w vancomycin for soft tissue infx in setting of necrotic sacral ulcers  - Wean levophed as tolerated (goal map 65)  - Patient on decadron 2 mg BID for pain 2/2 to colon ca with mets. in setting of shock will do stress dose steroids (load with 100 mg, then 50mg q6, followed by 50mg q8hrs)  - Follow up Blood Cultures   - Follow up Urine Cultures   - Aspiration Precautions   - NPO until mental status improves    DERM  #Sacral Wounds  - Pt has large necrotic sacral wounds (see above)   - C/w vanc for soft tissue infection  - Consult wound care, appreciate recs   - Consulted surgery, appreciate recs    PROPHYLAXIS  F: s/p 6 L IVF  E: replete PRN  N: NPO  Lines: 2 peripheral IVs and cam (prior to admission)   Dispo: MICU   81 year old M w/ PMHx of colon CA with mets (on decadron 2 mg BID for pain and encorafenib 75 mg QD), urinary retention 2/2 BPH with chronic indwelling cam, UTI recently discharged from [facility] ED on 4/13 after cam replacement, HTN who presents from nursing home for AMS, fever, and hypotension. ICU consulted for septic shock.     NEURO  #Toxic Metabolic Encephalopathy  Pt meets sepsis (3/4 SIRS criteria); s/p 6 L IVF. Baseline AOx2-3 per NH records, currently awake alert, not following commands but reports when in pain. Etiology most likely 2/2 to sepsis and dehydration due to acute change in mental status.   - Treat sepsis as below   - NPO pending improvement in mental status    CARDIOVASCULAR  # Shock  Patient in septic shock 2/2 gram negative bacteremia with underlying source UTI vs sacral ulcer, currently on levo gtt and maintaining MAP >65. Unknown hx of HF. Per NH records, no hx of CAD. EKG shows NSR w/ no ischemia/infarcts.   - Wean levo as titrated   - Consider TTE    PULMONARY   No acute issues, patient on room air, no signs or symptoms of increased work of breathing, and maintains oxygen saturation     GASTROINTESTINAL  #Transaminitis  Etiology of transaminitis likely septic shock  - Continue to monitor     #Hx of Crohn's disease and colon CA  S/p R hemicolectomy and liver abalation (unknown date)  - No acute interventions     RENAL  #BRAN  Patient with non-oliguric BRAN, BUN/Cr 31/0.90 -> 49/1.23 -> 43/0.91, etiology likely pre-renal in setting of septic shock, s/p 6 L IVF, overall BRAN stable, pt not oliguric.   - Continue fluids and monitor kidney function.      ENDOCRINOLOGY  WU  - mISS    HEME  #Anemia  Most likely 2/2 AOCD from Crohn's disease and colon CA.   - Transfuse Hgb <7  - Cont to monitor    ID  #Septic Shock  Patient presenting with SBP in 70's, tachycardic, with leukocytosis (end organ damage including BRAN and transmanitis). Source of shock could be 2/2 to uti (last visit was growing e coli and proteus, unclear whether patient completed course of PO cefpodoxime upon discharge) vs sacral wounds. On admission patient encephalopathic, cool upper extremities with dusky fingers, large necrotic sacral wounds ( [id number] sacral deep pressure injury, stage 4, and smaller 5x5 cm ulcer). S/p 4 L crystalloid solution, vanc, and cefepime in ED. Bedside US with A lines throughout, no overt reduction in cardiac fx, with very collapsable IVC.  Overall mental status improving, lactate negative.  BCx with gram negative rods.  - C/w ceftriaxone 2g q12 for UTI coverage (both e coli and proteus were sensitive on previous admission) and c/w vancomycin for soft tissue infx in setting of necrotic sacral ulcers  - Wean levophed as tolerated (goal map 65)  - Patient on decadron 2 mg BID for pain 2/2 to colon ca with mets. in setting of shock will do stress dose steroids (load with 100 mg, then 50mg q6, followed by 50mg q8hrs)  - Follow up Urine Cultures      DERM  #Sacral Wounds  Pt has large necrotic sacral wounds, possible cause of septic shock. S/p bedside debridement by surgery.    - C/w vanc (dose by trough) for soft tissue infection  - C/w dilaudid for pain  - Consult wound care, appreciate recs   - Consulted surgery, appreciate recs    PROPHYLAXIS  F: s/p 6 L IVF  E: replete PRN  N: NPO pending improvement in mental status  Lines: 2 peripheral IVs and cam (prior to admission)   Dispo: MICU

## 2022-04-24 NOTE — DIETITIAN INITIAL EVALUATION ADULT - PERTINENT MEDS FT
MEDICATIONS  (STANDING):  chlorhexidine 2% Cloths 1 Application(s) Topical <User Schedule>  dextrose 5%. 1000 milliLiter(s) (50 mL/Hr) IV Continuous <Continuous>  dextrose 5%. 1000 milliLiter(s) (100 mL/Hr) IV Continuous <Continuous>  dextrose 50% Injectable 25 Gram(s) IV Push once  dextrose 50% Injectable 12.5 Gram(s) IV Push once  dextrose 50% Injectable 25 Gram(s) IV Push once  enoxaparin Injectable 40 milliGRAM(s) SubCutaneous every 24 hours  glucagon  Injectable 1 milliGRAM(s) IntraMuscular once  insulin lispro (ADMELOG) corrective regimen sliding scale   SubCutaneous three times a day before meals  lactated ringers. 1000 milliLiter(s) (100 mL/Hr) IV Continuous <Continuous>  methylPREDNISolone sodium succinate Injectable 50 milliGRAM(s) IV Push every 8 hours  norepinephrine Infusion 0.05 MICROgram(s)/kG/Min (6 mL/Hr) IV Continuous <Continuous>  pantoprazole    Tablet 40 milliGRAM(s) Oral before breakfast  polyethylene glycol 3350 17 Gram(s) Oral daily    MEDICATIONS  (PRN):  dextrose Oral Gel 15 Gram(s) Oral once PRN Blood Glucose LESS THAN 70 milliGRAM(s)/deciliter  HYDROmorphone  Injectable 0.5 milliGRAM(s) IV Push every 2 hours PRN Mild Pain (1 - 3)

## 2022-04-24 NOTE — PROGRESS NOTE ADULT - SUBJECTIVE AND OBJECTIVE BOX
INTERVAL HPI/OVERNIGHT EVENTS:   Overnight lactate and troponin downtrending. Patient got Dilaudid 0.5mg at 10pm and 2am for sacral pain and ativan 0.5mg at 2:30am for anxiety.  Patient seen at bedside in AM, appears comfortable, no acute distress, awake and alert but not verbalizing or answering questions.    ICU Vital Signs Last 24 Hrs  T(C): 36 (2022 14:13), Max: 37.1 (2022 17:36)  T(F): 96.8 (2022 14:13), Max: 98.8 (2022 17:36)  HR: 92 (2022 14:00) (89 - 105)  BP: 88/57 (2022 14:00) (77/45 - 124/64)  BP(mean): 67 (2022 14:00) (54 - 90)  ABP: --  ABP(mean): --  RR: 20 (2022 14:00) (10 - 38)  SpO2: 97% (2022 14:00) (89% - 100%)    I&O's Summary    2022 07:01  -  2022 07:00  --------------------------------------------------------  IN: 1853.4 mL / OUT: 1020 mL / NET: 833.4 mL    2022 07:01  -  2022 14:49  --------------------------------------------------------  IN: 629 mL / OUT: 225 mL / NET: 404 mL      LABS:                        9.1    37.93 )-----------( 138      ( 2022 06:05 )             30.8     04-24    135  |  105  |  45<H>  ----------------------------<  143<H>  4.9   |  19<L>  |  0.86    Ca    7.9<L>      2022 06:05  Phos  4.9     -  Mg     2.4     -24    TPro  5.8<L>  /  Alb  2.0<L>  /  TBili  0.5  /  DBili  x   /  AST  195<H>  /  ALT  123<H>  /  AlkPhos  575<H>  -24    PT/INR - ( 2022 07:52 )   PT: 23.7 sec;   INR: 1.98          PTT - ( 2022 07:52 )  PTT:33.7 sec  Urinalysis Basic - ( 2022 09:52 )    Color: Yellow / Appearance: Clear / S.025 / pH: x  Gluc: x / Ketone: NEGATIVE  / Bili: Negative / Urobili: 1.0 E.U./dL   Blood: x / Protein: 30 mg/dL / Nitrite: NEGATIVE   Leuk Esterase: Trace / RBC: > 10 /HPF / WBC 5-10 /HPF   Sq Epi: x / Non Sq Epi: 0-5 /HPF / Bacteria: Present /HPF    CAPILLARY BLOOD GLUCOSE    POCT Blood Glucose.: 140 mg/dL (2022 11:07)  POCT Blood Glucose.: 142 mg/dL (2022 06:28)  POCT Blood Glucose.: 95 mg/dL (2022 16:59)    ABG - ( 2022 11:02 )  pH, Arterial: 7.36  pH, Blood: x     /  pCO2: 36    /  pO2: 184   / HCO3: 20    / Base Excess: -4.5  /  SaO2: 98.7        RADIOLOGY & ADDITIONAL TESTS:    Consultant(s) Notes Reviewed:  [x ] YES  [ ] NO    MEDICATIONS  (STANDING):  chlorhexidine 2% Cloths 1 Application(s) Topical <User Schedule>  dextrose 5%. 1000 milliLiter(s) (50 mL/Hr) IV Continuous <Continuous>  dextrose 5%. 1000 milliLiter(s) (100 mL/Hr) IV Continuous <Continuous>  dextrose 50% Injectable 25 Gram(s) IV Push once  dextrose 50% Injectable 12.5 Gram(s) IV Push once  dextrose 50% Injectable 25 Gram(s) IV Push once  enoxaparin Injectable 40 milliGRAM(s) SubCutaneous every 24 hours  glucagon  Injectable 1 milliGRAM(s) IntraMuscular once  insulin lispro (ADMELOG) corrective regimen sliding scale   SubCutaneous three times a day before meals  lactated ringers. 1000 milliLiter(s) (100 mL/Hr) IV Continuous <Continuous>  methylPREDNISolone sodium succinate Injectable 50 milliGRAM(s) IV Push every 8 hours  norepinephrine Infusion 0.05 MICROgram(s)/kG/Min (6 mL/Hr) IV Continuous <Continuous>  pantoprazole    Tablet 40 milliGRAM(s) Oral before breakfast  polyethylene glycol 3350 17 Gram(s) Oral daily    MEDICATIONS  (PRN):  dextrose Oral Gel 15 Gram(s) Oral once PRN Blood Glucose LESS THAN 70 milliGRAM(s)/deciliter  HYDROmorphone  Injectable 0.5 milliGRAM(s) IV Push every 2 hours PRN Mild Pain (1 - 3)      PHYSICAL EXAM:  GENERAL: resting comfortably, no acute distress   HEAD:  Atraumatic, Normocephalic  EYES: PERRLA, conjunctiva and sclera clear  NECK: Supple, No JVD, Normal thyroid, no enlarged nodes  NERVOUS SYSTEM:  Alert & Awake, oriented x0, not verbalizing or answering questions, not following comamnds   CHEST/LUNG: B/L good air entry; No rales, rhonchi, or wheezing  HEART: S1S2 normal, no S3, Regular rate and rhythm; No murmurs  ABDOMEN: Soft, Nontender, Nondistended; Bowel sounds present  EXTREMITIES:  2+ Peripheral Pulses, No clubbing, cyanosis, or edema  LYMPH: No lymphadenopathy noted  SKIN: No rashes or lesions    Care Discussed with Consultants/Other Providers [ x] YES  [ ] NO

## 2022-04-25 LAB
ALBUMIN SERPL ELPH-MCNC: 1.7 G/DL — LOW (ref 3.3–5)
ALP SERPL-CCNC: 403 U/L — HIGH (ref 40–120)
ALT FLD-CCNC: 99 U/L — HIGH (ref 10–45)
ANION GAP SERPL CALC-SCNC: 9 MMOL/L — SIGNIFICANT CHANGE UP (ref 5–17)
ANISOCYTOSIS BLD QL: SLIGHT — SIGNIFICANT CHANGE UP
AST SERPL-CCNC: 113 U/L — HIGH (ref 10–40)
BASOPHILS # BLD AUTO: 0 K/UL — SIGNIFICANT CHANGE UP (ref 0–0.2)
BASOPHILS NFR BLD AUTO: 0 % — SIGNIFICANT CHANGE UP (ref 0–2)
BILIRUB SERPL-MCNC: 0.3 MG/DL — SIGNIFICANT CHANGE UP (ref 0.2–1.2)
BUN SERPL-MCNC: 48 MG/DL — HIGH (ref 7–23)
BURR CELLS BLD QL SMEAR: PRESENT — SIGNIFICANT CHANGE UP
CALCIUM SERPL-MCNC: 7.8 MG/DL — LOW (ref 8.4–10.5)
CHLORIDE SERPL-SCNC: 102 MMOL/L — SIGNIFICANT CHANGE UP (ref 96–108)
CO2 SERPL-SCNC: 22 MMOL/L — SIGNIFICANT CHANGE UP (ref 22–31)
CREAT SERPL-MCNC: 0.9 MG/DL — SIGNIFICANT CHANGE UP (ref 0.5–1.3)
EGFR: 87 ML/MIN/1.73M2 — SIGNIFICANT CHANGE UP
EOSINOPHIL # BLD AUTO: 0 K/UL — SIGNIFICANT CHANGE UP (ref 0–0.5)
EOSINOPHIL NFR BLD AUTO: 0 % — SIGNIFICANT CHANGE UP (ref 0–6)
GIANT PLATELETS BLD QL SMEAR: PRESENT — SIGNIFICANT CHANGE UP
GLUCOSE BLDC GLUCOMTR-MCNC: 94 MG/DL — SIGNIFICANT CHANGE UP (ref 70–99)
GLUCOSE BLDC GLUCOMTR-MCNC: 94 MG/DL — SIGNIFICANT CHANGE UP (ref 70–99)
GLUCOSE SERPL-MCNC: 126 MG/DL — HIGH (ref 70–99)
HCT VFR BLD CALC: 25.2 % — LOW (ref 39–50)
HGB BLD-MCNC: 7.6 G/DL — LOW (ref 13–17)
HYPOCHROMIA BLD QL: SIGNIFICANT CHANGE UP
LYMPHOCYTES # BLD AUTO: 0.43 K/UL — LOW (ref 1–3.3)
LYMPHOCYTES # BLD AUTO: 1.7 % — LOW (ref 13–44)
MACROCYTES BLD QL: SLIGHT — SIGNIFICANT CHANGE UP
MAGNESIUM SERPL-MCNC: 2.5 MG/DL — SIGNIFICANT CHANGE UP (ref 1.6–2.6)
MANUAL SMEAR VERIFICATION: SIGNIFICANT CHANGE UP
MCHC RBC-ENTMCNC: 23.7 PG — LOW (ref 27–34)
MCHC RBC-ENTMCNC: 30.2 GM/DL — LOW (ref 32–36)
MCV RBC AUTO: 78.5 FL — LOW (ref 80–100)
MICROCYTES BLD QL: SLIGHT — SIGNIFICANT CHANGE UP
MONOCYTES # BLD AUTO: 0.88 K/UL — SIGNIFICANT CHANGE UP (ref 0–0.9)
MONOCYTES NFR BLD AUTO: 3.5 % — SIGNIFICANT CHANGE UP (ref 2–14)
NEUTROPHILS # BLD AUTO: 23.82 K/UL — HIGH (ref 1.8–7.4)
NEUTROPHILS NFR BLD AUTO: 94.8 % — HIGH (ref 43–77)
OVALOCYTES BLD QL SMEAR: SLIGHT — SIGNIFICANT CHANGE UP
PHOSPHATE SERPL-MCNC: 4.5 MG/DL — SIGNIFICANT CHANGE UP (ref 2.5–4.5)
PLAT MORPH BLD: ABNORMAL
PLATELET # BLD AUTO: 79 K/UL — LOW (ref 150–400)
POIKILOCYTOSIS BLD QL AUTO: SIGNIFICANT CHANGE UP
POLYCHROMASIA BLD QL SMEAR: SLIGHT — SIGNIFICANT CHANGE UP
POTASSIUM SERPL-MCNC: 4.6 MMOL/L — SIGNIFICANT CHANGE UP (ref 3.5–5.3)
POTASSIUM SERPL-SCNC: 4.6 MMOL/L — SIGNIFICANT CHANGE UP (ref 3.5–5.3)
PROT SERPL-MCNC: 5 G/DL — LOW (ref 6–8.3)
RBC # BLD: 3.21 M/UL — LOW (ref 4.2–5.8)
RBC # FLD: 16.8 % — HIGH (ref 10.3–14.5)
RBC BLD AUTO: ABNORMAL
SODIUM SERPL-SCNC: 133 MMOL/L — LOW (ref 135–145)
SPHEROCYTES BLD QL SMEAR: SLIGHT — SIGNIFICANT CHANGE UP
VANCOMYCIN FLD-MCNC: 22.9 UG/ML — SIGNIFICANT CHANGE UP
VANCOMYCIN TROUGH SERPL-MCNC: 18.7 UG/ML — SIGNIFICANT CHANGE UP (ref 10–20)
WBC # BLD: 25.13 K/UL — HIGH (ref 3.8–10.5)
WBC # FLD AUTO: 25.13 K/UL — HIGH (ref 3.8–10.5)

## 2022-04-25 PROCEDURE — 99291 CRITICAL CARE FIRST HOUR: CPT

## 2022-04-25 PROCEDURE — 99497 ADVNCD CARE PLAN 30 MIN: CPT | Mod: 25

## 2022-04-25 PROCEDURE — 99223 1ST HOSP IP/OBS HIGH 75: CPT

## 2022-04-25 PROCEDURE — 99358 PROLONG SERVICE W/O CONTACT: CPT | Mod: NC

## 2022-04-25 RX ORDER — SODIUM CHLORIDE 9 MG/ML
1000 INJECTION, SOLUTION INTRAVENOUS ONCE
Refills: 0 | Status: COMPLETED | OUTPATIENT
Start: 2022-04-25 | End: 2022-04-25

## 2022-04-25 RX ORDER — SODIUM CHLORIDE 9 MG/ML
500 INJECTION, SOLUTION INTRAVENOUS ONCE
Refills: 0 | Status: COMPLETED | OUTPATIENT
Start: 2022-04-25 | End: 2022-04-25

## 2022-04-25 RX ORDER — ALBUMIN HUMAN 25 %
250 VIAL (ML) INTRAVENOUS ONCE
Refills: 0 | Status: COMPLETED | OUTPATIENT
Start: 2022-04-25 | End: 2022-04-25

## 2022-04-25 RX ORDER — MIDODRINE HYDROCHLORIDE 2.5 MG/1
5 TABLET ORAL EVERY 8 HOURS
Refills: 0 | Status: DISCONTINUED | OUTPATIENT
Start: 2022-04-25 | End: 2022-04-27

## 2022-04-25 RX ORDER — CEFTRIAXONE 500 MG/1
2000 INJECTION, POWDER, FOR SOLUTION INTRAMUSCULAR; INTRAVENOUS EVERY 24 HOURS
Refills: 0 | Status: DISCONTINUED | OUTPATIENT
Start: 2022-04-26 | End: 2022-04-26

## 2022-04-25 RX ORDER — SODIUM CHLORIDE 9 MG/ML
1000 INJECTION, SOLUTION INTRAVENOUS
Refills: 0 | Status: COMPLETED | OUTPATIENT
Start: 2022-04-25 | End: 2022-04-25

## 2022-04-25 RX ORDER — HYDROCORTISONE 20 MG
50 TABLET ORAL EVERY 12 HOURS
Refills: 0 | Status: DISCONTINUED | OUTPATIENT
Start: 2022-04-25 | End: 2022-04-25

## 2022-04-25 RX ORDER — METRONIDAZOLE 500 MG
500 TABLET ORAL EVERY 8 HOURS
Refills: 0 | Status: DISCONTINUED | OUTPATIENT
Start: 2022-04-25 | End: 2022-04-27

## 2022-04-25 RX ORDER — HYDROMORPHONE HYDROCHLORIDE 2 MG/ML
0.25 INJECTION INTRAMUSCULAR; INTRAVENOUS; SUBCUTANEOUS ONCE
Refills: 0 | Status: DISCONTINUED | OUTPATIENT
Start: 2022-04-25 | End: 2022-04-25

## 2022-04-25 RX ORDER — HYDROMORPHONE HYDROCHLORIDE 2 MG/ML
0.25 INJECTION INTRAMUSCULAR; INTRAVENOUS; SUBCUTANEOUS EVERY 4 HOURS
Refills: 0 | Status: DISCONTINUED | OUTPATIENT
Start: 2022-04-25 | End: 2022-04-26

## 2022-04-25 RX ADMIN — Medication 50 MILLIGRAM(S): at 05:07

## 2022-04-25 RX ADMIN — ENOXAPARIN SODIUM 40 MILLIGRAM(S): 100 INJECTION SUBCUTANEOUS at 12:55

## 2022-04-25 RX ADMIN — HYDROMORPHONE HYDROCHLORIDE 0.25 MILLIGRAM(S): 2 INJECTION INTRAMUSCULAR; INTRAVENOUS; SUBCUTANEOUS at 01:21

## 2022-04-25 RX ADMIN — CHLORHEXIDINE GLUCONATE 1 APPLICATION(S): 213 SOLUTION TOPICAL at 05:08

## 2022-04-25 RX ADMIN — SODIUM CHLORIDE 1000 MILLILITER(S): 9 INJECTION, SOLUTION INTRAVENOUS at 00:41

## 2022-04-25 RX ADMIN — SODIUM CHLORIDE 500 MILLILITER(S): 9 INJECTION, SOLUTION INTRAVENOUS at 15:17

## 2022-04-25 RX ADMIN — HYDROMORPHONE HYDROCHLORIDE 0.25 MILLIGRAM(S): 2 INJECTION INTRAMUSCULAR; INTRAVENOUS; SUBCUTANEOUS at 00:51

## 2022-04-25 RX ADMIN — HYDROMORPHONE HYDROCHLORIDE 0.25 MILLIGRAM(S): 2 INJECTION INTRAMUSCULAR; INTRAVENOUS; SUBCUTANEOUS at 13:10

## 2022-04-25 RX ADMIN — HYDROMORPHONE HYDROCHLORIDE 0.25 MILLIGRAM(S): 2 INJECTION INTRAMUSCULAR; INTRAVENOUS; SUBCUTANEOUS at 13:30

## 2022-04-25 RX ADMIN — CEFTRIAXONE 100 MILLIGRAM(S): 500 INJECTION, POWDER, FOR SOLUTION INTRAMUSCULAR; INTRAVENOUS at 05:08

## 2022-04-25 RX ADMIN — Medication 125 MILLILITER(S): at 11:15

## 2022-04-25 RX ADMIN — HYDROMORPHONE HYDROCHLORIDE 0.25 MILLIGRAM(S): 2 INJECTION INTRAMUSCULAR; INTRAVENOUS; SUBCUTANEOUS at 23:52

## 2022-04-25 RX ADMIN — Medication 125 MILLILITER(S): at 17:33

## 2022-04-25 RX ADMIN — Medication 100 MILLIGRAM(S): at 21:14

## 2022-04-25 RX ADMIN — SODIUM CHLORIDE 1000 MILLILITER(S): 9 INJECTION, SOLUTION INTRAVENOUS at 03:17

## 2022-04-25 RX ADMIN — Medication 100 MILLIGRAM(S): at 13:59

## 2022-04-25 RX ADMIN — Medication 50 MILLIGRAM(S): at 17:35

## 2022-04-25 RX ADMIN — HYDROMORPHONE HYDROCHLORIDE 0.25 MILLIGRAM(S): 2 INJECTION INTRAMUSCULAR; INTRAVENOUS; SUBCUTANEOUS at 22:48

## 2022-04-25 RX ADMIN — HYDROMORPHONE HYDROCHLORIDE 0.25 MILLIGRAM(S): 2 INJECTION INTRAMUSCULAR; INTRAVENOUS; SUBCUTANEOUS at 23:05

## 2022-04-25 RX ADMIN — MIDODRINE HYDROCHLORIDE 5 MILLIGRAM(S): 2.5 TABLET ORAL at 17:36

## 2022-04-25 NOTE — PROGRESS NOTE ADULT - SUBJECTIVE AND OBJECTIVE BOX
SUBJECTIVE: Patient seen and examined bedside. Patient without acute events overnight. He refers to have some pain on the back when he is moved. No fevers     cefTRIAXone   IVPB 2000 milliGRAM(s) IV Intermittent every 24 hours  enoxaparin Injectable 40 milliGRAM(s) SubCutaneous every 24 hours  norepinephrine Infusion 0.05 MICROgram(s)/kG/Min IV Continuous <Continuous>      Vital Signs Last 24 Hrs  T(C): 35.9 (2022 05:02), Max: 36.1 (2022 09:00)  T(F): 96.6 (2022 05:02), Max: 97 (2022 09:00)  HR: 57 (2022 07:00) (57 - 98)  BP: 97/51 (:00) (88/57 - 128/59)  BP(mean): 71 (:00) (67 - 85)  RR: 16 (:) (12 - 30)  SpO2: 98% (:00) (96% - 100%)  I&O's Detail    2022 07:01  -  2022 07:00  --------------------------------------------------------  IN:    IV PiggyBack: 300 mL    Lactated Ringers: 2100 mL    Lactated Ringers Bolus: 1000 mL    Norepinephrine: 31.4 mL  Total IN: 3431.4 mL    OUT:    Indwelling Catheter - Urethral (mL): 602 mL  Total OUT: 602 mL    Total NET: 2829.4 mL      Physical Exam:  General: NAD, resting comfortably in bed  Pulmonary: Nonlabored breathing, no respiratory distress  Cardiovascular: NSR  Abdominal: soft, mild RUQ TTP, ND  Back: Stage L shoulder ulcer stage II and eschar on the upper half, ulcer of the left glutenous undetermined stage. 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 and foul smell but no pus or other sign of infection, no edema of the perineum or tenderness  Extremities: WWP, normal strength        LABS:                        7.6    25.13 )-----------( 79       ( 2022 05:38 )             25.2     04-25    133<L>  |  102  |  48<H>  ----------------------------<  126<H>  4.6   |  22  |  0.90    Ca    7.8<L>      2022 05:38  Phos  4.5     04-25  Mg     2.5     -25    TPro  5.0<L>  /  Alb  1.7<L>  /  TBili  0.3  /  DBili  x   /  AST  113<H>  /  ALT  99<H>  /  AlkPhos  403<H>  -25    PT/INR - ( 2022 07:52 )   PT: 23.7 sec;   INR: 1.98          PTT - ( 2022 07:52 )  PTT:33.7 sec  Urinalysis Basic - ( 2022 09:52 )    Color: Yellow / Appearance: Clear / S.025 / pH: x  Gluc: x / Ketone: NEGATIVE  / Bili: Negative / Urobili: 1.0 E.U./dL   Blood: x / Protein: 30 mg/dL / Nitrite: NEGATIVE   Leuk Esterase: Trace / RBC: > 10 /HPF / WBC 5-10 /HPF   Sq Epi: x / Non Sq Epi: 0-5 /HPF / Bacteria: Present /HPF        RADIOLOGY & ADDITIONAL STUDIES:   SUBJECTIVE: Patient seen and examined bedside. Patient without acute events overnight. He refers to have some pain on the back when he is moved. No fevers     cefTRIAXone   IVPB 2000 milliGRAM(s) IV Intermittent every 24 hours  enoxaparin Injectable 40 milliGRAM(s) SubCutaneous every 24 hours  norepinephrine Infusion 0.05 MICROgram(s)/kG/Min IV Continuous <Continuous>      Vital Signs Last 24 Hrs  T(C): 35.9 (2022 05:02), Max: 36.1 (2022 09:00)  T(F): 96.6 (2022 05:02), Max: 97 (2022 09:00)  HR: 57 (2022 07:00) (57 - 98)  BP: 97/51 (2022 07:00) (88/57 - 128/59)  BP(mean): 71 (2022 07:00) (67 - 85)  RR: 16 (:00) (12 - 30)  SpO2: 98% (2022 07:00) (96% - 100%)  I&O's Detail    2022 07:01  -  2022 07:00  --------------------------------------------------------  IN:    IV PiggyBack: 300 mL    Lactated Ringers: 2100 mL    Lactated Ringers Bolus: 1000 mL    Norepinephrine: 31.4 mL  Total IN: 3431.4 mL    OUT:    Indwelling Catheter - Urethral (mL): 602 mL  Total OUT: 602 mL    Total NET: 2829.4 mL      Physical Exam:  General: NAD, resting comfortably in bed  Pulmonary: Nonlabored breathing, no respiratory distress  Cardiovascular: NSR  Abdominal: soft, no abdominal tenderness, ND  Back: Stage L shoulder ulcer stage II and eschar on the upper half, ulcer of the left glutenous undetermined stage. 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 and foul smell but no pus or other sign of infection, no edema of the perineum or tenderness  Extremities: WWP, normal strength        LABS:                        7.6    25.13 )-----------( 79       ( 2022 05:38 )             25.2     04-25    133<L>  |  102  |  48<H>  ----------------------------<  126<H>  4.6   |  22  |  0.90    Ca    7.8<L>      2022 05:38  Phos  4.5     04-25  Mg     2.5     -25    TPro  5.0<L>  /  Alb  1.7<L>  /  TBili  0.3  /  DBili  x   /  AST  113<H>  /  ALT  99<H>  /  AlkPhos  403<H>  04-25    PT/INR - ( 2022 07:52 )   PT: 23.7 sec;   INR: 1.98          PTT - ( 2022 07:52 )  PTT:33.7 sec  Urinalysis Basic - ( 2022 09:52 )    Color: Yellow / Appearance: Clear / S.025 / pH: x  Gluc: x / Ketone: NEGATIVE  / Bili: Negative / Urobili: 1.0 E.U./dL   Blood: x / Protein: 30 mg/dL / Nitrite: NEGATIVE   Leuk Esterase: Trace / RBC: > 10 /HPF / WBC 5-10 /HPF   Sq Epi: x / Non Sq Epi: 0-5 /HPF / Bacteria: Present /HPF        RADIOLOGY & ADDITIONAL STUDIES:

## 2022-04-25 NOTE — ADVANCED PRACTICE NURSE CONSULT - ASSESSMENT
81 year old M w/ PMHx of colon CA with mets (on decadron 2 mg BID for pain and encorafenib 75 mg QD), urinary retention 2/2 BPH with chronic indwelling cam, UTI recently discharged from [facility] ED on 4/13 after cam replacement, HTN who presents from nursing home for AMS, fever, and hypotension. ICU consulted for septic shock. Pt admitted with the following pressure injuries: Upper back unstageable pressure injury measuring 13 x 12 with soft eschar in the center of the wound, scattered brown slough, ecchymosis from 6-7 o'clock to periwound extending down pt's back 10 cm in a pattern of friction. Left hip with a deep tissue injury measuring 11.5 cm x 7.5 cm, right heel deep tissue injury measuring 3x 4.5 cm. Left ischial tuberosity unstageable measuring 8 x 9 cm, 50% of wound appears to have been a deep tissue injury which has evolved, 50% of wound is black eschar. Sacrum with Stage 4 pressury measuring 14 x 12 x 4 cm, soft black eschar in 30% of wound, brown slough in 40%, remainder of wound is non-granulating tissue and muscle. Undermining noted from 7-3 cm, greatest aspect at 9 o'clock, 4.5 cm. Pt has offloading pillow and offloading boots in use.

## 2022-04-25 NOTE — PROGRESS NOTE ADULT - ATTENDING COMMENTS
80 yo M w/ hx of colon ca, BPH c/b chronic urinary retention s/p cam, HTN, presents with AMS, fever, hypotension, leukocytosis w/ hx of + UA and E coli from 4/13, unclear if pt completed cefpodoxime course, likely septic shock w/ bacteremia  due to bacteroides, UCx with proteus and E Coli. Bacteremia likely 2/2 seeding from decub ulcer.  UTI is pansensitive, will add flagyl for bacteremia. Patient continuing to request no further interventions. BP intermittently labile and UOP fluctuating, may be related to incompletely treated infection vs. poor PO intake and severely malnourished with alb of 1.7. Will give 5% alb 250cc x 1. Appears cachectic, uncomfortable, refusing physical exam today and asking to be left alone. DNR/DNI, but will obtain palliative care consult and consider comfort care per pt's wishes. Offered medication for pain but patient refused. Continue to taper stress steroids. Will attempt dysphagia screen if pt agreeable to eat.

## 2022-04-25 NOTE — CONSULT NOTE ADULT - CONVERSATION DETAILS
Pts chart reviewed, is DNR DNI. Pt lethargic, improving mentation iso metabolic encephalopathy, however is unable to participate in complex medical decision making. Reached out to pts sister, June, who lives in [place].  Introduced the role of pall med for GOC, symptom management, support.     June shares that while pts long term gf Brittany may have more accurate collateral, she understands that pt has had worsening functional decline, PO intake, progression of BLE extremity weakness and debility in recent weeks/months. For his cancer, pt is pending further imaging after which oncologist may offer more cancer directed therapy.     Discussed hospital course and recent developments. Pt treated for septic shock likely 2/2 large necrotic sacral wound. Today pt is off pressors with improvement in mentation and responsiveness, however consistently asking to be left alone.     June is hopeful that pt will continue to improve and receive further cancer treatment, if offered. Explained that pt is not currently a candidate for further DMT and would need significant functional improvement to receive further cancer directed treatment. June appreciative of transparency and understands transitioning to hospice and focusing on comfort is avail at any point. She shares that pt does Not want any invasive interventions or procedures. She is amenable to temporary NGT if needed, however long term PEG is Not within GOC. If pt decompensates overnight, she would Not want to escalate/restart pressors; rather she would want to transition to care with focus on comfort.     Introduced [facility] for wound care; she is very interested in this verus pt returning to [facility].     June avail by phone; is traveling to [place] Next Tuesday May 3.     All questions answered. Emotional support provided.

## 2022-04-25 NOTE — CONSULT NOTE ADULT - PROBLEM SELECTOR RECOMMENDATION 5
.  - HCP sister June Berger #[phone]  - DNR DNI  - Discussion with June: introduced role pall med, reviewed medical updates. Reviewed and counseled on GOC: Minimize invasive interventions. No PEG.   - Expressed to June that pt appears uncomfortable, is consistently asking to be left alone to rest, refusing physical exam   -  If decompensates overnight with drop in BP, she would Not want to restart/escalate pressors; instead transition to care with focus on comfort

## 2022-04-25 NOTE — CONSULT NOTE ADULT - PROBLEM SELECTOR RECOMMENDATION 6
.  Recommend transition to comfort care given overall clinical picture, limited long term prognosis, and pts refusal of interventions.  Introduced [facility] for EOL care and Wound Care as an alternative discharge.     Active Psychosocial Referrals:   SW/CM[  ] PT/OT[  ] SS[  x ]Hospice[  ]  Ethics[  ]    Coping:  well[  ] with difficulty[  ] poor coping[  ] unable to assess[ x ]   Support system: strong[  ] adequate[ x ] inadequate[  ]    - All questions answered, emotional support provided  -  primary team   - For new or uncontrolled symptoms, please call Palliative Care at [phone]. The service is available 24/7 (including nights & weekends) to provide symptom management recommendations over the phone as appropriate  - Will continue to follow with you

## 2022-04-25 NOTE — ADVANCED PRACTICE NURSE CONSULT - RECOMMEDATIONS
Recommend Vashe to be used to moisten wilber then lightly pack in sacral and upper back wounds, Cavilon barrier wipes over the DTIs. Spoke with NATALIE Eller and house staff.

## 2022-04-25 NOTE — PROGRESS NOTE ADULT - ASSESSMENT
81 year old M w/ PMHx of colon CA with mets (on decadron 2 mg BID for pain and encorafenib 75 mg QD), urinary retention 2/2 BPH with chronic indwelling cam, UTI recently discharged from [facility] ED on 4/13 after cam replacement, HTN who presents from nursing home for AMS, fever, and hypotension. ICU consulted for septic shock.     NEURO  #Toxic Metabolic Encephalopathy  Pt meets sepsis (3/4 SIRS criteria); s/p 6 L IVF. Baseline AOx2-3 per NH records, currently awake alert, verbal but unable to answer questions when prompted. Etiology most likely 2/2 to sepsis and dehydration due to acute change in mental status.   - Treat sepsis as below   - NPO pending improvement in mental status    CARDIOVASCULAR  # Shock  Patient in septic shock 2/2 gram negative bacteremia with underlying source UTI vs sacral ulcer, currently on levo gtt and maintaining MAP >65. Unknown hx of HF. Per NH records, no hx of CAD. EKG shows NSR w/ no ischemia/infarcts.   - Off pressors and maintaining MAP 65+  - Cont to monitor     PULMONARY   No acute issues, patient on room air, no signs or symptoms of increased work of breathing, and maintains oxygen saturation     GASTROINTESTINAL  #Transaminitis - DOWNTRENDING   Etiology of transaminitis likely septic shock  - Continue to monitor     #Hx of Crohn's disease and colon CA  S/p R hemicolectomy and liver abalation (unknown date)  - No acute interventions     RENAL  #BRAN  Patient with non-oliguric BRAN, BUN/Cr 31/0.90 -> 49/1.23 -> 43/0.91, etiology likely pre-renal in setting of septic shock, s/p 6 L IVF, overall BRAN stable, pt not oliguric.   - Continue fluids and monitor kidney function.      ENDOCRINOLOGY  WU  - mISS    HEME  #Anemia  Most likely 2/2 AOCD from Crohn's disease and colon CA.   - Transfuse Hgb <7  - Cont to monitor    ID  #Septic Shock  Patient presenting with SBP in 70's, tachycardic, with leukocytosis (end organ damage including BRAN and transmanitis). Source of shock could be 2/2 to uti (last visit was growing e coli and proteus, unclear whether patient completed course of PO cefpodoxime upon discharge) vs sacral wounds. On admission patient encephalopathic, cool upper extremities with dusky fingers, large necrotic sacral wounds ( [id number] sacral deep pressure injury, stage 4, and smaller 5x5 cm ulcer). S/p 4 L crystalloid solution, vanc, and cefepime in ED. Bedside US with A lines throughout, no overt reduction in cardiac fx, with very collapsable IVC.  Overall mental status improving, lactate negative.  BCx with gram negative rods.  - C/w ceftriaxone 2g q12 for UTI coverage (both e coli and proteus were sensitive on previous admission)   - D/gopal vancomycin 4/25   - Patient on decadron 2 mg BID for pain 2/2 to colon ca with mets. in setting of shock stress dosed steroids (load with 100 mg, then 50mg q6, followed by 50mg q8hrs); switched to home dose on 4/25  - Blood cx positive for bacteroides fragils, switched to flagyl 500 mg Q8hrs (4/25-)    DERM  #Sacral Wounds  Pt has large necrotic sacral wounds, possible cause of septic shock. S/p bedside debridement by surgery.    - C/w vanc (dose by trough) for soft tissue infection  - C/w dilaudid for pain  - Consult wound care, appreciate recs   - Consulted surgery, appreciate recs    PROPHYLAXIS  F: s/p 6 L IVF  E: replete PRN  N: NPO pending dysphagia screen   Lines: 2 peripheral IVs and cam (prior to admission)   Dispo: MICU

## 2022-04-25 NOTE — CONSULT NOTE ADULT - PROBLEM SELECTOR RECOMMENDATION 2
.  alb 1.7  Clinical evidence indicates that the patient has Severe protein calorie malnutrition/ 3rd degree    In context of     Acute Illness/Injury (>7days)    vs    Chronic Illness (>1 month)    Energy/Food intake <50% of estimated energy requirement >5 days  Weight loss: Moderate - severe   Body Fat loss: Severe   (Cachexia, temporal wasting, muscle atrophy)  Muscle mass loss: Severe  (Skin failure/pressure ulcers)   Strength: weakened severe (bedbound)    Recommend:   - pending SS eval   - nutritional supplements as tolerated, nutrition consult  - PEG is Not within GOC; HCP would consider NGT, however pt refusing most interventions

## 2022-04-25 NOTE — CONSULT NOTE ADULT - SUBJECTIVE AND OBJECTIVE BOX
HPI:  This is a 80 y/o M with PMHx of colon CA, BPH urinary retention with cam (recently replaced 4/13 at  ED), HTN who presents from nursing home for altered mental status, fever, and sepsis.      As per transfer notes, pt had temp of 101.7 with BP in 70s this morning and was less responsive than usual. Pt recently treated with E.Coli UTI dx at [facility] on 4/13 sensitive to cefpodoxime (completed course). Unable to obtain history from patient as pt is altered and septic.  Pt accompanied by MOLST confirming DNR/DNI.  Emergency contact is legal guardian Brittany Bocanegra [phone] - attempted to be contacted but went to  and  full.  Pt required immediate attention upon arrival to ED secondary to hypotension and AMS. ICU and surgery was consulted.    ED Course  Vitals: 97.7F, 77-87, ~90/50, 93% on 3 L NC  Labs: WBC 20.88 -> 15.43, Hgb 8.5 ->8.2, Hct 28.5 -> 26.8, Plts 137-126, PT 23.7, INR 1.98, Na 134, HCO3 21, BUN 49, Creatinine 1.23, Gluc 131, lactate 3.3, albumin 1.7, Alk Phos 520, , ALT 88; pH 7.36, pCO2 36, pO2 184, urine WBC 5-0, RBC >10, bacteria present  Imaging: CXR - no acute infiltrates  EKG: , QTc 473, no signs of ischemia/infarcts  Interventions: Tylenol 1g, cefepime 1 g x1, vanc 1 g x1, levo gtt, NS 1 L boluses x3, LR 1 L boluses x2 (23 Apr 2022 13:40)      PRESENT SYMPTOMS:   [ ]Unable to obtain due to poor mentation   Source if other than patient:  [ ]Family   [ ]Team     Pain:  Location :        Quality:  Radiation:  Timing:  Aggravating factors:  Minimal acceptable level (0-10 scale):   Severity in last 24h (0-10 scale) :  Current score (0-10 scale):  Improves with:     PAIN AD Score:   [email or web address] (press ctrl +  left click to view)    If [ ], pt denies symptom.   Dyspnea:         [ ]Mild [ ]Moderate [ ]Severe  Anxiety:           [ ]Mild [ ]Moderate [ ]Severe  Difficulty sleeping: [  ]  Fatigue:           [ ]Mild [ ]Moderate [ ]Severe  Nausea:           [ ]Mild [ ]Moderate [ ]Severe  Loss of appetite:     [ ]Mild [ ]Moderate [ ]Severe  Dysphagia: [  ]  Constipation:   [ ]Mild [ ]Moderate [ ]Severe  LBM   Grief Present   [ ] Yes   [ ] No   Other Symptoms:    All other review of systems negative [ ]    Opiate Naive (Y/N):   iStop reviewed (Y/N): Yes.   No Rx found on iStop review (Ref#:           PAST MEDICAL & SURGICAL HISTORY:  Urinary retention  Colon cancer  History of BPH  HTN (hypertension)    FAMILY HISTORY:   Reviewed and found non contributory in mother or father    Items that are not checked are not present  PSYCHOSOCIAL ASSESSMENT:  - Significant other/partner: [  ]  Children: [  ]  - Living Situation: Home [  ] Long term care [  ]  Rehab[  ]  Other[  ]  - Support system: strong[  ] adequate[  ] inadequate[  ]  - Christianity/Spiritual practice:  - Role of organized Orthodox important [  ] some [  ] unable to assess dt pt mentation [  ]  - Coping: well[  ]  with difficulty[  ]  poor coping[  ]  unable to assess dt pt mentation [  ]  - What is most important to patient?  - What worries patient the most?  - Is patient at peace? :     ADVANCE DIRECTIVES:    - MOLST[  ]    Living Will [  ]   DECISION MAKER(s):  - Health Care Proxy(s) [  ]  Surrogate(s)[  ] Guardian[  ]           Name(s)/Phone Number(s):     BASELINE (I)ADLs (prior to admission):  - [place]:  Total[  ] Moderate[  ] Dependent[  ]    Allergies  penicillin (Unknown)  sulfamethoxazole (Unknown)  Intolerances        Medications:      MEDICATIONS  (STANDING):  albumin human  5% IVPB 250 milliLiter(s) IV Intermittent once  chlorhexidine 2% Cloths 1 Application(s) Topical <User Schedule>  dextrose 5%. 1000 milliLiter(s) (50 mL/Hr) IV Continuous <Continuous>  dextrose 5%. 1000 milliLiter(s) (100 mL/Hr) IV Continuous <Continuous>  dextrose 50% Injectable 25 Gram(s) IV Push once  dextrose 50% Injectable 12.5 Gram(s) IV Push once  dextrose 50% Injectable 25 Gram(s) IV Push once  enoxaparin Injectable 40 milliGRAM(s) SubCutaneous every 24 hours  glucagon  Injectable 1 milliGRAM(s) IntraMuscular once  insulin lispro (ADMELOG) corrective regimen sliding scale   SubCutaneous every 6 hours  methylPREDNISolone sodium succinate Injectable 50 milliGRAM(s) IV Push every 12 hours  metroNIDAZOLE  IVPB 500 milliGRAM(s) IV Intermittent every 8 hours  pantoprazole    Tablet 40 milliGRAM(s) Oral before breakfast  polyethylene glycol 3350 17 Gram(s) Oral daily    MEDICATIONS  (PRN):  dextrose Oral Gel 15 Gram(s) Oral once PRN Blood Glucose LESS THAN 70 milliGRAM(s)/deciliter  HYDROmorphone  Injectable 0.25 milliGRAM(s) IV Push every 4 hours PRN Mild Pain (1 - 3)      Labs:    CBC:                        7.6    25.13 )-----------( 79       ( 25 Apr 2022 05:38 )             25.2     CMP:    04-25    133<L>  |  102  |  48<H>  ----------------------------<  126<H>  4.6   |  22  |  0.90    Ca    7.8<L>      25 Apr 2022 05:38  Phos  4.5     04-25  Mg     2.5     04-25    TPro  5.0<L>  /  Alb  1.7<L>  /  TBili  0.3  /  DBili  x   /  AST  113<H>  /  ALT  99<H>  /  AlkPhos  403<H>  04-25           RADIOLOGY & ADDITIONAL STUDIES:  Imaging:  Reviewed    PROTEIN CALORIE MALNUTRITION PRESENT:  [ ] Yes  [ ] No  Albumin, Serum:     [ ] PPSV2 < or = to 30%   [ ] significant weight loss    [ ] poor nutritional intake  [ ] catabolic state   [ ] anasarca       [ ] Artificial Nutrition    PEx:  T(C): 36.2 (04-25-22 @ 14:27), Max: 36.2 (04-25-22 @ 09:41)  HR: 94 (04-25-22 @ 15:00) (57 - 98)  BP: 87/54 (04-25-22 @ 15:00) (87/52 - 128/59)  RR: 13 (04-25-22 @ 15:00) (12 - 25)  SpO2: 99% (04-25-22 @ 15:00) (95% - 100%)  Wt(kg): --    ECOG Performance:       Current Palliative Performance Scale/Karnofsky Score:    %  Preadmit Karnofsky:   %            General: alert  oriented x _    lethargic, distressed, cachexia,  nonverbal,  unarousable, verbal  HEENT: atraumatic, No abnormal lesions, No dry mouth,  temporal wasting, ET tube/trach  RESP: Reg rhythm,   tachypnea/labored breathing,   audible excessive secretions,  CTAB, diminished bilat bases  CV: RRR, S1S2,   tachycardia  GI: soft non distended non tender  incontinent               PEG/NG/OG tube                 constipation  last BM:   : normal  incontinent  oliguria/anuria  cam  MUSK: weakness x4,  edema, ambulatory with assistance,   mostly/fully  BB/WC bound  SKIN: No abnormal skin lesions, Poor skin turgor, Pressure ulcer stage:   NEURO:  No deficits, cognitive impairment, encephalopathic dsyphagia dysarthria paresis  Oral intake ability: unable/only mouth care, minimal moderate full capability        BMI:  Wt:  Cachexia (Y/N):     PALLIATIVE MEDICINE COORDINATION OF CARE DOCUMENTATION: [x] Inpatient Consult  Non-Face-to-Face prolonged service provided that relates to (face-to-face) care that has or will occur and ongoing patient management, including one or more of the following: - Reviewed documentation from other physicians and other health care professional services - Reviewed medical records and diagnostic / radiology study results - Coordination with patient's support system  ************************************************************************  MEDICATION REVIEW:  - See Medication List Above    ISTOP REFERENCE:   - no active Rxs   - PRN usage: NO PRN'S  ------------------------------------------------------------------------  COORDINATION OF CARE:  - Palliative Care consulted for: GOC / Symptom Management  - Patient (to be) assessed: 4/25  - Patient previously seen by Palliative Care service: NO    ADVANCE CARE PLANNING  - Code status: FULL  - MOLST reviewed in chart: NONE; None found on Alpha  - HCP/ Surrogate: NONE found on Alpha  - GOC documents: NONE found on Alpha  - HCP/ Living will/Other Advanced Directives in Alpha: NONE found on Alpha  ------------------------------------------------------------------------  CARE PROVIDER DOCUMENTATION:  - SW/CM notes: Remains medically active  -   -     PLAN OF CARE  - Known admissions to [facility] in past year:   - Current admit date:  - LOS:  - LACE score:   - Current dispo plan: TO BE DETERMINED    04-23-22 (2d)  ------------------------------------------------------------------------  - Time Spent/Chart reviewed: 31 Minutes [including time used to gather, review and transfer data]  - Start: 3:25p  - End: 3:58p     Prolonged services rendered, as part of this patient's care provided by Palliative Medicine, include: i. chart review for provider and ancillary service documentation, ii. pertinent diagnostics including laboratory and imaging studies, iii. medication review including PRN use, iv. admission history including previous palliative care encounters and GOC notes, v. advance care planning documents including HCP and MOLST forms in Alpha. Part of Palliative Medicine extended evaluation and management also involves coordination of care with our IDT, the primary and consulting teams, and unit CM/SW and Hospice if eligible. Recommendations based on the information gathered and discussed are outlined in the A/P of Palliative notes. HPI:  This is a 82 y/o M with PMHx of colon CA, BPH urinary retention with cam (recently replaced 4/13 at  ED), HTN who presents from nursing home for altered mental status, fever, and sepsis.      As per transfer notes, pt had temp of 101.7 with BP in 70s this morning and was less responsive than usual. Pt recently treated with E.Coli UTI dx at [facility] on 4/13 sensitive to cefpodoxime (completed course). Unable to obtain history from patient as pt is altered and septic.  Pt accompanied by MOLST confirming DNR/DNI.  Emergency contact is legal guardian Brittany Bocanegra [phone] - attempted to be contacted but went to  and  full.  Pt required immediate attention upon arrival to ED secondary to hypotension and AMS. ICU and surgery was consulted.    ED Course  Vitals: 97.7F, 77-87, ~90/50, 93% on 3 L NC  Labs: WBC 20.88 -> 15.43, Hgb 8.5 ->8.2, Hct 28.5 -> 26.8, Plts 137-126, PT 23.7, INR 1.98, Na 134, HCO3 21, BUN 49, Creatinine 1.23, Gluc 131, lactate 3.3, albumin 1.7, Alk Phos 520, , ALT 88; pH 7.36, pCO2 36, pO2 184, urine WBC 5-0, RBC >10, bacteria present  Imaging: CXR - no acute infiltrates  EKG: , QTc 473, no signs of ischemia/infarcts  Interventions: Tylenol 1g, cefepime 1 g x1, vanc 1 g x1, levo gtt, NS 1 L boluses x3, LR 1 L boluses x2 (23 Apr 2022 13:40)    Pt briefly on levo gtt for septic shock 2/2 gram neg bacteremia 2/2 UTI vs large sacral ulcer cb toxic metabolic encephalopathy. Palliative care consulted for GOC. Pt seen and examined at bedside. Lying in bed comfortably. Mentation improved today, opens eyes when prompted but lethargic and difficulty maintaining attention. Asking to be "left alone" saying he "just wants to rest."     Reached out to pts sister and legal HCP June #[phone]. Pt with significant functional decline in recent months. Atrophied bilat lower extremities, severe weakness, poor PO [facility] at [facility] prior to [facility] ([facility] after previous admission). HCP Maintains DNR DNI. Long term feeding goals: temp NGT ok, PEG not within GOC. If decompensates overnight with subsequent drop in BP, wishes to transition to care with focus on comfort; do Not escalate pressors.       PRESENT SYMPTOMS:   [ ]Unable to obtain due to poor mentation   Source if other than patient:  [ ]Family   [ ]Team     Pain: denies, known pain to sacral ulcer  Location :     sacral ulcer   Aggravating factors: immobility pressure  Current score (0-10 scale): comfortable, recently received Dilaudid 0.25 mg IV PRN  Improves with:  opioids    PAIN AD Score: 0  [email or web address] (press ctrl +  left click to view)    If [ ], pt denies symptom.   Dyspnea:         [ ]Mild [ ]Moderate [ ]Severe  Anxiety:           [ ]Mild [ ]Moderate [ ]Severe  Difficulty sleeping: [  ]  Fatigue:           [ ]Mild [ ]Moderate [ x]Severe  Nausea:           [ ]Mild [ ]Moderate [ ]Severe  Loss of appetite:     [ ]Mild [ ]Moderate [ x]Severe  Constipation:   [ ]Mild [ ]Moderate [ ]Severe  Other Symptoms:    All other review of systems negative [x ]    Opiate Naive (Y/N): Yes  iStop reviewed (Y/N): Yes.    Rx found on iStop review (Ref#:    [id number]       PAST MEDICAL & SURGICAL HISTORY:  Urinary retention  Colon cancer  History of BPH  HTN (hypertension)    FAMILY HISTORY:   Reviewed and found non contributory in mother or father    Items that are not checked are not present  PSYCHOSOCIAL ASSESSMENT:  - Significant other/partner: [ x ] long term gf Brittany Children: [  ]  - Living Situation: Home [  ] Long term care [  ]  Rehab[ x ]  Other[  ]  - Support system: strong[  ] adequate[  ] inadequate[x  ]  - Spiritism/Spiritual practice:  - Role of organized Orthodox important [  ] some [  ] unable to assess dt pt mentation [ x ]  - Coping: well[  ]  with difficulty[  ]  poor coping[  ]  unable to assess dt pt mentation [ x ]    ADVANCE DIRECTIVES:    - MOLST[ x ]    Living Will [  ]   DECISION MAKER(s):  - Health Care Proxy(s) [  ]  Surrogate(s)[  ] Guardian[  ]           Name(s)/Phone Number(s):   HCP sister June Berger: #[phone]  long term gf Brittany Bocanegra     BASELINE (I)ADLs (prior to admission):  - [place]:  Total[  x] Moderate[  x] Dependent[  ]    Allergies  penicillin (Unknown)  sulfamethoxazole (Unknown)  Intolerances        Medications:      MEDICATIONS  (STANDING):  albumin human  5% IVPB 250 milliLiter(s) IV Intermittent once  chlorhexidine 2% Cloths 1 Application(s) Topical <User Schedule>  dextrose 5%. 1000 milliLiter(s) (50 mL/Hr) IV Continuous <Continuous>  dextrose 5%. 1000 milliLiter(s) (100 mL/Hr) IV Continuous <Continuous>  dextrose 50% Injectable 25 Gram(s) IV Push once  dextrose 50% Injectable 12.5 Gram(s) IV Push once  dextrose 50% Injectable 25 Gram(s) IV Push once  enoxaparin Injectable 40 milliGRAM(s) SubCutaneous every 24 hours  glucagon  Injectable 1 milliGRAM(s) IntraMuscular once  insulin lispro (ADMELOG) corrective regimen sliding scale   SubCutaneous every 6 hours  methylPREDNISolone sodium succinate Injectable 50 milliGRAM(s) IV Push every 12 hours  metroNIDAZOLE  IVPB 500 milliGRAM(s) IV Intermittent every 8 hours  pantoprazole    Tablet 40 milliGRAM(s) Oral before breakfast  polyethylene glycol 3350 17 Gram(s) Oral daily    MEDICATIONS  (PRN):  dextrose Oral Gel 15 Gram(s) Oral once PRN Blood Glucose LESS THAN 70 milliGRAM(s)/deciliter  HYDROmorphone  Injectable 0.25 milliGRAM(s) IV Push every 4 hours PRN Mild Pain (1 - 3)      Labs:    CBC:                        7.6    25.13 )-----------( 79       ( 25 Apr 2022 05:38 )             25.2     CMP:    04-25    133<L>  |  102  |  48<H>  ----------------------------<  126<H>  4.6   |  22  |  0.90    Ca    7.8<L>      25 Apr 2022 05:38  Phos  4.5     04-25  Mg     2.5     04-25    TPro  5.0<L>  /  Alb  1.7<L>  /  TBili  0.3  /  DBili  x   /  AST  113<H>  /  ALT  99<H>  /  AlkPhos  403<H>  04-25           RADIOLOGY & ADDITIONAL STUDIES:  Imaging:  Reviewed    < from: Xray Chest 1 View- PORTABLE-Urgent (Xray Chest 1 View- PORTABLE-Urgent .) (04.23.22 @ 08:17) >  IMPRESSION: No acute infiltrates      PROTEIN CALORIE MALNUTRITION PRESENT:  [x ] Yes  [ ] No  Albumin, Serum: 1.7     [x ] PPSV2 < or = to 30%   [x ] significant weight loss    [x ] poor nutritional intake  [ ] catabolic state   [ ] anasarca       [ ] Artificial Nutrition    PEx:  T(C): 36.2 (04-25-22 @ 14:27), Max: 36.2 (04-25-22 @ 09:41)  HR: 94 (04-25-22 @ 15:00) (57 - 98)  BP: 87/54 (04-25-22 @ 15:00) (87/52 - 128/59)  RR: 13 (04-25-22 @ 15:00) (12 - 25)  SpO2: 99% (04-25-22 @ 15:00) (95% - 100%)  Wt(kg): --    ECOG Performance:     4  Current Palliative Performance Scale/Karnofsky Score: 20   %  Preadmit Karnofsky:  40 %            General: oriented x 2 lethargic, chronically ill and weak appearing   HEENT: atraumatic, No abnormal lesions, No dry mouth, + temporal wasting,   RESP: Reg rhythm,  No tachypnea/labored breathing, No   audible excessive secretions,  CTAB, diminished bilat bases  CV: RRR, S1S2,  No  tachycardia  GI: soft non distended non tender  incontinent   : oliguria  cam  MUSK: weakness x4,  mostly/fully  BB bound  SKIN: Stage II L shoulder ulcer , unstageable sacral ulcer   NEURO:  + encephalopathic but more attentive and responsive per chart  Oral intake ability: minimal  capability        BMI: 20  Wt: 64  Cachexia (Y/N):  N    PALLIATIVE MEDICINE COORDINATION OF CARE DOCUMENTATION: [x] Inpatient Consult  Non-Face-to-Face prolonged service provided that relates to (face-to-face) care that has or will occur and ongoing patient management, including one or more of the following: - Reviewed documentation from other physicians and other health care professional services - Reviewed medical records and diagnostic / radiology study results - Coordination with patient's support system  ************************************************************************  MEDICATION REVIEW:  - See Medication List Above    ISTOP REFERENCE:   - no active Rxs   - PRN usage: NO PRN'S  ------------------------------------------------------------------------  COORDINATION OF CARE:  - Palliative Care consulted for: [facility] / Symptom Management  - Patient (to be) assessed: 4/25  - Patient previously seen by Palliative Care service: NO    ADVANCE CARE PLANNING  - Code status: DNR DNI  - VERONICA prior  - HCP: sister June Berger   - [facility] documents: 4/23  - HCP/ Living will/Other Advanced Directives in Alpha: NONE found on Alpha  ------------------------------------------------------------------------  CARE PROVIDER DOCUMENTATION:  - SW/CM notes: Remains medically active  - Surg: sp debridement of sacral decub IV on 4/23, wound care consulted, Consider US abdomen to evaluate GB pathology if there is no other source for sepsis.   - MICU: off pressors, mentating improving     PLAN OF CARE  - Known admissions to [facility] in past year: current  - Current admit date: 4/23  - LOS: two days  - LACE score:  11  - Current dispo plan: TO BE DETERMINED    04-23-22 (2d)  ------------------------------------------------------------------------  - Time Spent/Chart reviewed: 31 Minutes [including time used to gather, review and transfer data]  - Start: 3:25p  - End: 3:58p     Prolonged services rendered, as part of this patient's care provided by Palliative Medicine, include: i. chart review for provider and ancillary service documentation, ii. pertinent diagnostics including laboratory and imaging studies, iii. medication review including PRN use, iv. admission history including previous palliative care encounters and GOC notes, v. advance care planning documents including HCP and MOLST forms in Alpha. Part of Palliative Medicine extended evaluation and management also involves coordination of care with our IDT, the primary and consulting teams, and unit CM/SW and Hospice if eligible. Recommendations based on the information gathered and discussed are outlined in the A/P of Palliative notes.

## 2022-04-25 NOTE — CONSULT NOTE ADULT - PROBLEM SELECTOR RECOMMENDATION 9
.  extensive wounds   - currently with dilaudid 0.25 mg IV q4 PRN moderate pain   - based on PRN use will assess for long acting opioid vs scheduled Dilaudid

## 2022-04-25 NOTE — CONSULT NOTE ADULT - PROBLEM SELECTOR RECOMMENDATION 3
.  admitted with the following pressure injuries: Upper back unstageable pressure injury measuring 13 x 12, Left hip with a deep tissue injury measuring 11.5 cm x 7.5 cm, right heel deep tissue injury measuring 3x 4.5 cm. Left ischial tuberosity unstageable measuring 8 x 9 cm,  Sacrum with Stage 4 pressure measuring 14 x 12 x 4 cm  - sepsis likely 2/2 seeding from wound  - appreciative surg and wound car recs   - pain management as above   - would benefit from [facility] for EOL care and Wound care

## 2022-04-25 NOTE — PROGRESS NOTE ADULT - ASSESSMENT
81M PMH colon cancer, BPH, HTN admitted from nursing home for AMS, fever to 102, sepsis of unknown origin with recent history of E. coli and Proteus UTI treated at [facility] on 4/13 completed course of abx. Pt underwent sacral decub IV ulcer debridement on 4/23 without purulence or fluctuance. Stage II L shoulder ulcer w/o purulence or fluctuance. And undetermined gluteale ulcer, Ulcers unlikely to be source of sepsis and elevated WBC. In the setting of elevated LFTs and pained response to RUQ palpation on exam, possible gallbladder pathology could be considered if lacking source.    Recommendations   Wound care with WtD packing  Pressure offloading  Nutritional optimization  Seen and examined w/ attending surgeon  Rest of plan per primary    Team 4c will continue to follow 81M PMH colon cancer, BPH, HTN admitted from nursing home for AMS, fever to 102, sepsis of unknown origin with recent history of E. coli and Proteus UTI treated at [facility] on 4/13 completed course of abx. Pt underwent sacral decub IV ulcer debridement on 4/23 without purulence or fluctuance. Stage II L shoulder ulcer w/o purulence or fluctuance. And undetermined gluteale ulcer, Ulcers unlikely to be source of sepsis and elevated WBC.     No erythema or purulence from sacral ulcer or signs of infection in the perineum or groin. WBC trending down. LFTs improving. Consider US abdomen to evaluate GB pathology if there is no other source for sepsis.     Recommendations   Wound care ostomy recommendations for pressure ulcers.   Wound care with WtD packing at least once a day and PRN  Pressure offloading  Nutritional optimization  Rest of plan per primary  Team 4c will continue to follow    Patient discussed with attending and chief residents

## 2022-04-25 NOTE — CONSULT NOTE ADULT - PROBLEM SELECTOR RECOMMENDATION 4
.  metastatic  - pt would need significant improvement in functional status prior to receiving further disease directed therapy; overall is poor candidate for further treatment and has limited long term prognosis given performance status, nutritional status   - If no further disease modifying treatments offered or patient/family declined further disease modifying treatments,  patient would qualify for hospice

## 2022-04-25 NOTE — PROGRESS NOTE ADULT - SUBJECTIVE AND OBJECTIVE BOX
INTERVAL HPI/OVERNIGHT EVENTS: decreased UOP to ~20cc/hr, received 1L LR.     SUBJECTIVE: Patient seen at bedside in AM, who responds saying "no more." Unable to verbalize if in any pain, awake and alert, VSS.     OBJECTIVE  Vital Signs Last 24 Hrs  T(C): 36.2 (25 Apr 2022 09:41), Max: 36.2 (25 Apr 2022 09:41)  T(F): 97.1 (25 Apr 2022 09:41), Max: 97.1 (25 Apr 2022 09:41)  HR: 88 (25 Apr 2022 11:00) (57 - 98)  BP: 98/57 (25 Apr 2022 11:00) (88/57 - 128/59)  BP(mean): 72 (25 Apr 2022 11:00) (64 - 85)  RR: 15 (25 Apr 2022 11:00) (12 - 21)  SpO2: 99% (25 Apr 2022 11:00) (96% - 100%) RA     I&O's Summary    24 Apr 2022 07:01  -  25 Apr 2022 07:00  --------------------------------------------------------  IN: 3431.4 mL / OUT: 632 mL / NET: 2799.4 mL    25 Apr 2022 07:01  -  25 Apr 2022 13:28  --------------------------------------------------------  IN: 0 mL / OUT: 57 mL / NET: -57 mL    I&O's Summary    23 Apr 2022 07:01  -  24 Apr 2022 07:00  --------------------------------------------------------  IN: 1853.4 mL / OUT: 1020 mL / NET: 833.4 mL    24 Apr 2022 07:01  -  24 Apr 2022 14:49  --------------------------------------------------------  IN: 629 mL / OUT: 225 mL / NET: 404 mL    PHYSICAL EXAM:  GENERAL: resting comfortably, no acute distress   HEAD:  Atraumatic, Normocephalic  EYES: PERRLA, conjunctiva and sclera clear  NECK: Supple, No JVD, Normal thyroid, no enlarged nodes  NERVOUS SYSTEM:  Alert & Awake, oriented x0, not verbalizing or answering questions, not following comamnds   CHEST/LUNG: B/L good air entry; No rales, rhonchi, or wheezing  HEART: S1S2 normal, no S3, Regular rate and rhythm; No murmurs  ABDOMEN: Soft, Nontender, Nondistended; Bowel sounds present  EXTREMITIES:  2+ Peripheral Pulses, No clubbing, cyanosis, or edema  LYMPH: No lymphadenopathy noted  SKIN: No rashes or lesions      LABS:                                    7.6    25.13 )-----------( 79       ( 25 Apr 2022 05:38 )             25.2     04-25    133<L>  |  102  |  48<H>  ----------------------------<  126<H>  4.6   |  22  |  0.90    Ca    7.8<L>      25 Apr 2022 05:38  Phos  4.5     04-25  Mg     2.5     04-25    TPro  5.0<L>  /  Alb  1.7<L>  /  TBili  0.3  /  DBili  x   /  AST  113<H>  /  ALT  99<H>  /  AlkPhos  403<H>  04-25      RADIOLOGY & ADDITIONAL TESTS: yes    Consultant(s) Notes Reviewed:  [x ] YES  [ ] NO    MEDICATIONS  (STANDING):  chlorhexidine 2% Cloths 1 Application(s) Topical <User Schedule>  dextrose 5%. 1000 milliLiter(s) (50 mL/Hr) IV Continuous <Continuous>  dextrose 5%. 1000 milliLiter(s) (100 mL/Hr) IV Continuous <Continuous>  dextrose 50% Injectable 25 Gram(s) IV Push once  dextrose 50% Injectable 12.5 Gram(s) IV Push once  dextrose 50% Injectable 25 Gram(s) IV Push once  enoxaparin Injectable 40 milliGRAM(s) SubCutaneous every 24 hours  glucagon  Injectable 1 milliGRAM(s) IntraMuscular once  insulin lispro (ADMELOG) corrective regimen sliding scale   SubCutaneous three times a day before meals  lactated ringers. 1000 milliLiter(s) (100 mL/Hr) IV Continuous <Continuous>  methylPREDNISolone sodium succinate Injectable 50 milliGRAM(s) IV Push every 8 hours  norepinephrine Infusion 0.05 MICROgram(s)/kG/Min (6 mL/Hr) IV Continuous <Continuous>  pantoprazole    Tablet 40 milliGRAM(s) Oral before breakfast  polyethylene glycol 3350 17 Gram(s) Oral daily    MEDICATIONS  (PRN):  dextrose Oral Gel 15 Gram(s) Oral once PRN Blood Glucose LESS THAN 70 milliGRAM(s)/deciliter  HYDROmorphone  Injectable 0.5 milliGRAM(s) IV Push every 2 hours PRN Mild Pain (1 - 3)      Care Discussed with Consultants/Other Providers [ x] YES  [ ] NO Statement Selected

## 2022-04-25 NOTE — CONSULT NOTE ADULT - ASSESSMENT
80 yo M w/ PMHx of met colon Ca, urinary retention 2/2 BPH with chronic indwelling cam, UTI recently discharged from [facility] ED on 4/13 after cam replacement, HTN who presents from nursing home for AMS, fever, and hypotension. Admitted to MICU for septic shock 2/2 large necrotic sacral wound. Palliative care consulted for GOC.     ·	Introduced role pall med, reviewed medical updates, support provided to HCP sister June Berger #[phone]  ·	Reviewed and counseled on GOC: DNR DNI. Minimize invasive interventions. No PEG.   ·	pt currently not candidate for further DMT, would need significant functional improvement to receive further cancer directed treatment   ·	pt is eligible for hospice, but not within GOC at this time  ·	If decompensates overnight with drop in BP, she would Not want to restart/escalate pressors; instead transition to care with focus on comfort   ·	will continue to follow     full note to follow  82 yo M w/ PMHx of met colon Ca, urinary retention 2/2 BPH with chronic indwelling cam, UTI recently discharged from [facility] ED on 4/13 after cam replacement, HTN who presents from nursing home for AMS, fever, and hypotension. Admitted to MICU for septic shock 2/2 large necrotic sacral wound. Palliative care consulted for GOC.     ·	Introduced role pall med, reviewed medical updates, support provided to HCP sister June Berger #[phone]  ·	Reviewed and counseled on GOC: DNR DNI. Minimize invasive interventions. No PEG.   ·	pt currently not candidate for further DMT, would need significant functional improvement to receive further cancer directed treatment   ·	pt is eligible for hospice, but not within GOC at this time  ·	If decompensates overnight with drop in BP, she would Not want to restart/escalate pressors; instead transition to care with focus on comfort   ·	will continue to follow

## 2022-04-26 DIAGNOSIS — R52 PAIN, UNSPECIFIED: ICD-10-CM

## 2022-04-26 DIAGNOSIS — S31.000A UNSPECIFIED OPEN WOUND OF LOWER BACK AND PELVIS WITHOUT PENETRATION INTO RETROPERITONEUM, INITIAL ENCOUNTER: ICD-10-CM

## 2022-04-26 DIAGNOSIS — N17.9 ACUTE KIDNEY FAILURE, UNSPECIFIED: ICD-10-CM

## 2022-04-26 DIAGNOSIS — Z71.89 OTHER SPECIFIED COUNSELING: ICD-10-CM

## 2022-04-26 DIAGNOSIS — E43 UNSPECIFIED SEVERE PROTEIN-CALORIE MALNUTRITION: ICD-10-CM

## 2022-04-26 DIAGNOSIS — D64.9 ANEMIA, UNSPECIFIED: ICD-10-CM

## 2022-04-26 DIAGNOSIS — A41.9 SEPSIS, UNSPECIFIED ORGANISM: ICD-10-CM

## 2022-04-26 DIAGNOSIS — K50.90 CROHN'S DISEASE, UNSPECIFIED, WITHOUT COMPLICATIONS: ICD-10-CM

## 2022-04-26 DIAGNOSIS — Z51.5 ENCOUNTER FOR PALLIATIVE CARE: ICD-10-CM

## 2022-04-26 DIAGNOSIS — Z29.9 ENCOUNTER FOR PROPHYLACTIC MEASURES, UNSPECIFIED: ICD-10-CM

## 2022-04-26 DIAGNOSIS — C18.9 MALIGNANT NEOPLASM OF COLON, UNSPECIFIED: ICD-10-CM

## 2022-04-26 DIAGNOSIS — N39.0 URINARY TRACT INFECTION, SITE NOT SPECIFIED: ICD-10-CM

## 2022-04-26 DIAGNOSIS — R13.10 DYSPHAGIA, UNSPECIFIED: ICD-10-CM

## 2022-04-26 DIAGNOSIS — D69.6 THROMBOCYTOPENIA, UNSPECIFIED: ICD-10-CM

## 2022-04-26 DIAGNOSIS — R74.01 ELEVATION OF LEVELS OF LIVER TRANSAMINASE LEVELS: ICD-10-CM

## 2022-04-26 DIAGNOSIS — G92.8 OTHER TOXIC ENCEPHALOPATHY: ICD-10-CM

## 2022-04-26 DIAGNOSIS — T14.8XXA OTHER INJURY OF UNSPECIFIED BODY REGION, INITIAL ENCOUNTER: ICD-10-CM

## 2022-04-26 LAB
ALBUMIN SERPL ELPH-MCNC: 2.5 G/DL — LOW (ref 3.3–5)
ALP SERPL-CCNC: 344 U/L — HIGH (ref 40–120)
ALT FLD-CCNC: 73 U/L — HIGH (ref 10–45)
ANION GAP SERPL CALC-SCNC: 11 MMOL/L — SIGNIFICANT CHANGE UP (ref 5–17)
ANISOCYTOSIS BLD QL: SLIGHT — SIGNIFICANT CHANGE UP
AST SERPL-CCNC: 79 U/L — HIGH (ref 10–40)
BASOPHILS # BLD AUTO: 0 K/UL — SIGNIFICANT CHANGE UP (ref 0–0.2)
BASOPHILS NFR BLD AUTO: 0 % — SIGNIFICANT CHANGE UP (ref 0–2)
BILIRUB SERPL-MCNC: 0.4 MG/DL — SIGNIFICANT CHANGE UP (ref 0.2–1.2)
BUN SERPL-MCNC: 50 MG/DL — HIGH (ref 7–23)
CALCIUM SERPL-MCNC: 8 MG/DL — LOW (ref 8.4–10.5)
CHLORIDE SERPL-SCNC: 105 MMOL/L — SIGNIFICANT CHANGE UP (ref 96–108)
CO2 SERPL-SCNC: 20 MMOL/L — LOW (ref 22–31)
CREAT SERPL-MCNC: 0.85 MG/DL — SIGNIFICANT CHANGE UP (ref 0.5–1.3)
EGFR: 88 ML/MIN/1.73M2 — SIGNIFICANT CHANGE UP
EOSINOPHIL # BLD AUTO: 0 K/UL — SIGNIFICANT CHANGE UP (ref 0–0.5)
EOSINOPHIL NFR BLD AUTO: 0 % — SIGNIFICANT CHANGE UP (ref 0–6)
GIANT PLATELETS BLD QL SMEAR: PRESENT — SIGNIFICANT CHANGE UP
GLUCOSE BLDC GLUCOMTR-MCNC: 87 MG/DL — SIGNIFICANT CHANGE UP (ref 70–99)
GLUCOSE BLDC GLUCOMTR-MCNC: 88 MG/DL — SIGNIFICANT CHANGE UP (ref 70–99)
GLUCOSE BLDC GLUCOMTR-MCNC: 89 MG/DL — SIGNIFICANT CHANGE UP (ref 70–99)
GLUCOSE BLDC GLUCOMTR-MCNC: 94 MG/DL — SIGNIFICANT CHANGE UP (ref 70–99)
GLUCOSE SERPL-MCNC: 88 MG/DL — SIGNIFICANT CHANGE UP (ref 70–99)
HCT VFR BLD CALC: 23.9 % — LOW (ref 39–50)
HGB BLD-MCNC: 7.2 G/DL — LOW (ref 13–17)
HYPOCHROMIA BLD QL: SLIGHT — SIGNIFICANT CHANGE UP
LYMPHOCYTES # BLD AUTO: 0 % — LOW (ref 13–44)
LYMPHOCYTES # BLD AUTO: 0 K/UL — LOW (ref 1–3.3)
MAGNESIUM SERPL-MCNC: 2.2 MG/DL — SIGNIFICANT CHANGE UP (ref 1.6–2.6)
MANUAL SMEAR VERIFICATION: SIGNIFICANT CHANGE UP
MCHC RBC-ENTMCNC: 24 PG — LOW (ref 27–34)
MCHC RBC-ENTMCNC: 30.1 GM/DL — LOW (ref 32–36)
MCV RBC AUTO: 79.7 FL — LOW (ref 80–100)
MICROCYTES BLD QL: SLIGHT — SIGNIFICANT CHANGE UP
MONOCYTES # BLD AUTO: 0 K/UL — SIGNIFICANT CHANGE UP (ref 0–0.9)
MONOCYTES NFR BLD AUTO: 0 % — LOW (ref 2–14)
NEUTROPHILS # BLD AUTO: 17.51 K/UL — HIGH (ref 1.8–7.4)
NEUTROPHILS NFR BLD AUTO: 100 % — HIGH (ref 43–77)
OVALOCYTES BLD QL SMEAR: SLIGHT — SIGNIFICANT CHANGE UP
PHOSPHATE SERPL-MCNC: 4.3 MG/DL — SIGNIFICANT CHANGE UP (ref 2.5–4.5)
PLAT MORPH BLD: NORMAL — SIGNIFICANT CHANGE UP
PLATELET # BLD AUTO: 42 K/UL — LOW (ref 150–400)
POTASSIUM SERPL-MCNC: 4.7 MMOL/L — SIGNIFICANT CHANGE UP (ref 3.5–5.3)
POTASSIUM SERPL-SCNC: 4.7 MMOL/L — SIGNIFICANT CHANGE UP (ref 3.5–5.3)
PROT SERPL-MCNC: 5.2 G/DL — LOW (ref 6–8.3)
RBC # BLD: 3 M/UL — LOW (ref 4.2–5.8)
RBC # FLD: 16.7 % — HIGH (ref 10.3–14.5)
RBC BLD AUTO: ABNORMAL
SODIUM SERPL-SCNC: 136 MMOL/L — SIGNIFICANT CHANGE UP (ref 135–145)
VANCOMYCIN TROUGH SERPL-MCNC: 16.3 UG/ML — SIGNIFICANT CHANGE UP (ref 10–20)
WBC # BLD: 17.51 K/UL — HIGH (ref 3.8–10.5)
WBC # FLD AUTO: 17.51 K/UL — HIGH (ref 3.8–10.5)

## 2022-04-26 PROCEDURE — 99233 SBSQ HOSP IP/OBS HIGH 50: CPT

## 2022-04-26 PROCEDURE — 99232 SBSQ HOSP IP/OBS MODERATE 35: CPT | Mod: GC

## 2022-04-26 PROCEDURE — 99223 1ST HOSP IP/OBS HIGH 75: CPT | Mod: GC,AI

## 2022-04-26 RX ORDER — HYDROMORPHONE HYDROCHLORIDE 2 MG/ML
0.5 INJECTION INTRAMUSCULAR; INTRAVENOUS; SUBCUTANEOUS
Refills: 0 | Status: DISCONTINUED | OUTPATIENT
Start: 2022-04-26 | End: 2022-04-27

## 2022-04-26 RX ORDER — HYDROMORPHONE HYDROCHLORIDE 2 MG/ML
0.5 INJECTION INTRAMUSCULAR; INTRAVENOUS; SUBCUTANEOUS EVERY 4 HOURS
Refills: 0 | Status: DISCONTINUED | OUTPATIENT
Start: 2022-04-26 | End: 2022-04-27

## 2022-04-26 RX ORDER — ALBUMIN HUMAN 25 %
250 VIAL (ML) INTRAVENOUS ONCE
Refills: 0 | Status: COMPLETED | OUTPATIENT
Start: 2022-04-26 | End: 2022-04-26

## 2022-04-26 RX ORDER — DEXAMETHASONE 0.5 MG/5ML
2 ELIXIR ORAL EVERY 12 HOURS
Refills: 0 | Status: DISCONTINUED | OUTPATIENT
Start: 2022-04-26 | End: 2022-04-27

## 2022-04-26 RX ORDER — ACETAMINOPHEN 500 MG
1000 TABLET ORAL ONCE
Refills: 0 | Status: COMPLETED | OUTPATIENT
Start: 2022-04-26 | End: 2022-04-26

## 2022-04-26 RX ORDER — CEFTRIAXONE 500 MG/1
2000 INJECTION, POWDER, FOR SOLUTION INTRAMUSCULAR; INTRAVENOUS EVERY 24 HOURS
Refills: 0 | Status: DISCONTINUED | OUTPATIENT
Start: 2022-04-27 | End: 2022-04-27

## 2022-04-26 RX ORDER — HYDROMORPHONE HYDROCHLORIDE 2 MG/ML
0.25 INJECTION INTRAMUSCULAR; INTRAVENOUS; SUBCUTANEOUS EVERY 4 HOURS
Refills: 0 | Status: DISCONTINUED | OUTPATIENT
Start: 2022-04-26 | End: 2022-04-26

## 2022-04-26 RX ADMIN — Medication 125 MILLILITER(S): at 02:15

## 2022-04-26 RX ADMIN — HYDROMORPHONE HYDROCHLORIDE 0.5 MILLIGRAM(S): 2 INJECTION INTRAMUSCULAR; INTRAVENOUS; SUBCUTANEOUS at 14:13

## 2022-04-26 RX ADMIN — HYDROMORPHONE HYDROCHLORIDE 0.5 MILLIGRAM(S): 2 INJECTION INTRAMUSCULAR; INTRAVENOUS; SUBCUTANEOUS at 10:09

## 2022-04-26 RX ADMIN — Medication 100 MILLIGRAM(S): at 13:33

## 2022-04-26 RX ADMIN — MIDODRINE HYDROCHLORIDE 5 MILLIGRAM(S): 2.5 TABLET ORAL at 09:29

## 2022-04-26 RX ADMIN — Medication 400 MILLIGRAM(S): at 09:29

## 2022-04-26 RX ADMIN — Medication 2 MILLIGRAM(S): at 17:21

## 2022-04-26 RX ADMIN — HYDROMORPHONE HYDROCHLORIDE 0.5 MILLIGRAM(S): 2 INJECTION INTRAMUSCULAR; INTRAVENOUS; SUBCUTANEOUS at 22:57

## 2022-04-26 RX ADMIN — CHLORHEXIDINE GLUCONATE 1 APPLICATION(S): 213 SOLUTION TOPICAL at 05:45

## 2022-04-26 RX ADMIN — CEFTRIAXONE 100 MILLIGRAM(S): 500 INJECTION, POWDER, FOR SOLUTION INTRAMUSCULAR; INTRAVENOUS at 05:42

## 2022-04-26 RX ADMIN — HYDROMORPHONE HYDROCHLORIDE 0.25 MILLIGRAM(S): 2 INJECTION INTRAMUSCULAR; INTRAVENOUS; SUBCUTANEOUS at 07:02

## 2022-04-26 RX ADMIN — HYDROMORPHONE HYDROCHLORIDE 0.5 MILLIGRAM(S): 2 INJECTION INTRAMUSCULAR; INTRAVENOUS; SUBCUTANEOUS at 22:42

## 2022-04-26 RX ADMIN — PANTOPRAZOLE SODIUM 40 MILLIGRAM(S): 20 TABLET, DELAYED RELEASE ORAL at 05:42

## 2022-04-26 RX ADMIN — Medication 1000 MILLIGRAM(S): at 09:44

## 2022-04-26 RX ADMIN — MIDODRINE HYDROCHLORIDE 5 MILLIGRAM(S): 2.5 TABLET ORAL at 17:21

## 2022-04-26 RX ADMIN — HYDROMORPHONE HYDROCHLORIDE 0.5 MILLIGRAM(S): 2 INJECTION INTRAMUSCULAR; INTRAVENOUS; SUBCUTANEOUS at 18:04

## 2022-04-26 RX ADMIN — HYDROMORPHONE HYDROCHLORIDE 0.5 MILLIGRAM(S): 2 INJECTION INTRAMUSCULAR; INTRAVENOUS; SUBCUTANEOUS at 18:20

## 2022-04-26 RX ADMIN — Medication 100 MILLIGRAM(S): at 22:41

## 2022-04-26 RX ADMIN — HYDROMORPHONE HYDROCHLORIDE 0.25 MILLIGRAM(S): 2 INJECTION INTRAMUSCULAR; INTRAVENOUS; SUBCUTANEOUS at 07:20

## 2022-04-26 RX ADMIN — Medication 100 MILLIGRAM(S): at 05:43

## 2022-04-26 RX ADMIN — HYDROMORPHONE HYDROCHLORIDE 0.5 MILLIGRAM(S): 2 INJECTION INTRAMUSCULAR; INTRAVENOUS; SUBCUTANEOUS at 10:29

## 2022-04-26 RX ADMIN — MIDODRINE HYDROCHLORIDE 5 MILLIGRAM(S): 2.5 TABLET ORAL at 00:39

## 2022-04-26 RX ADMIN — HYDROMORPHONE HYDROCHLORIDE 0.25 MILLIGRAM(S): 2 INJECTION INTRAMUSCULAR; INTRAVENOUS; SUBCUTANEOUS at 00:10

## 2022-04-26 RX ADMIN — Medication 50 MILLIGRAM(S): at 05:43

## 2022-04-26 RX ADMIN — HYDROMORPHONE HYDROCHLORIDE 0.5 MILLIGRAM(S): 2 INJECTION INTRAMUSCULAR; INTRAVENOUS; SUBCUTANEOUS at 14:30

## 2022-04-26 NOTE — PROGRESS NOTE ADULT - PROBLEM SELECTOR PLAN 4
Patient with non-oliguric BRAN, BUN/Cr 31/0.90 -> 49/1.23 -> 43/0.91, etiology likely pre-renal in setting of septic shock, s/p 6 L IVF, overall BRAN stable, pt not oliguric.   - Continue fluids and monitor kidney function. Transaminitis - DOWNTRENDING   Etiology of transaminitis likely septic shock  - Continue to monitor RESOLVED - Patient with non-oliguric BRAN, BUN/Cr 31/0.90 -> 49/1.23 -> 43/0.91, etiology likely pre-renal in setting of septic shock, s/p 6 L IVF, overall BRAN stable, pt not oliguric.   - Continue fluids and monitor kidney function.

## 2022-04-26 NOTE — PROGRESS NOTE ADULT - ATTENDING COMMENTS
Patient was seen and examined with the resident team today.  I agree with Dr. Sanchez's assessment and plan with the following exceptions/additions:     Briefly, this is a 82yo gentleman with a PMH of HTN, colon cancer, multiple pressure ulcers POA (stage IV sacral, stage 2 L shoulder) and BPH w/chronic indwelling Christopher who presented from his NH with septic shock 2/2 acute E. coli and Proteus cystitis, as well as Bacteroids bacteremia.  Course also notable for anemia NOS, ATN, progressive thrombocytopenia POA and persistent metabolic encephalopathy c/b dysphagia.  His sister/June and HCP has made him DNR/DNI with no escalation of care.  She would like to watch him overnight to re-assess tomorrow if he should be made comfort care with hospice planning to [place].      #Septic shock 2/2 polymicrobial UTI and Bacteroides bacteremia - needs repeat blood cultures; c/w CTX x 7 days for UTI and Flagyl IV x 2 weeks for bacteremia  #BPH s/p chronic indwelling Christopher - mgmt of infection as per above  #Metabolic encephalopathy c/b dysphagia - NPO after failing SLP; advancing GOC with Pall Care's assistance   #Stage IV and stage II pressure ulcers - not source of infection per Surgery; debrided by Surgery, Wound Care following and appreciate assistance  #Anemia - trend CBC for Hb >7 while advancing GOC  #ATN - resolved  #Elevated BUN - etiology unclear as ATN resolved; monitor BM's and Hb   #Thrombocytopenia POA - worsening with unclear etiology; f/u work-up but may be limited pending GOC   #Colon Ca - on steroids for cancer-related pain, can continue; c/w Dilaudid; GOC as per above   #DVT PPx - on hold given thrombocytopenia  #Dispo - pending GOC but Palliative Care exploring [place] with sister    Sharon Moffett  [phone] Patient was seen and examined with the resident team today.  I agree with Dr. Sanchez's assessment and plan with the following exceptions/additions:     Briefly, this is a 80yo gentleman with a PMH of HTN, colon cancer, multiple pressure ulcers POA (stage IV sacral, stage 2 L shoulder) and BPH w/chronic indwelling Christopher who presented from his NH with septic shock 2/2 acute E. coli and Proteus cystitis, as well as Bacteroids bacteremia.  Course also notable for anemia NOS, ATN, progressive thrombocytopenia POA and persistent metabolic encephalopathy c/b dysphagia.  His sister/June and HCP has made him DNR/DNI with no escalation of care.  She would like to watch him overnight to re-assess tomorrow if he should be made comfort care with hospice planning to [place].      #Septic shock 2/2 polymicrobial UTI and Bacteroides bacteremia - needs repeat blood cultures; c/w CTX x 7 days for UTI and Flagyl IV x 2 weeks for bacteremia  #BPH s/p chronic indwelling Christopher - mgmt of infection as per above  #Metabolic encephalopathy c/b dysphagia - NPO after failing SLP; advancing GOC with Pall Care's assistance   #Stage IV and stage II pressure ulcers - not source of infection per Surgery; debrided by Surgery, Wound Care following and appreciate assistance  #Anemia - trend CBC for Hb >7 while advancing GOC  #ATN - resolved  #Elevated BUN - etiology unclear as ATN resolved; monitor BM's and Hb   #Thrombocytopenia POA - worsening with unclear etiology; f/u work-up but may be limited pending GOC   #Colon Ca - on steroids for cancer-related pain, can continue; c/w Dilaudid; GOC as per above  #Severe protein calorie malnutrition - will advance feeds pending GOC discussions    #DVT PPx - on hold given thrombocytopenia  #Dispo - pending GOC but Palliative Care exploring [place] with sister    Sharon Moffett  [phone]

## 2022-04-26 NOTE — PROGRESS NOTE ADULT - PROBLEM SELECTOR PLAN 6
.  - HCP sister June Berger #[phone]  - DNR DNI, No PEG.  - Today reviewed current clinical status, reiterated pts poor long term prognosis and unlikelihood to receive further disease modifying treatments  - Shared decision made to being transition to care with exclusive focus on comfort   - Amenable to place referral for transfer to [facility] for EOL and wound care

## 2022-04-26 NOTE — PROGRESS NOTE ADULT - PROBLEM SELECTOR PLAN 7
.  Ongoing assessment and titration of medications to ensure comfort through EOL. appreciate palliative SW Merlin Martines assistance with discharge.     Active Psychosocial Referrals:   SW/CM[  ] PT/OT[  ] Chaplaincy[   ]Hospice[x  ]  Ethics[  ]  Viri Ortiz Patient/Family Support[  ] Holistic RN[  ] Massage Therapy[  ]    Coping:  well[  ] with difficulty[  ] poor coping[  ] unable to assess[ x ]   Support system: strong[  ] adequate[ x ] inadequate[  ]    - All questions answered, emotional support provided  -  primary team, Dr. Moffett, Dr. Palomino   - For new or uncontrolled symptoms, please call Palliative Care at [phone]. The service is available 24/7 (including nights & weekends) to provide symptom management recommendations over the phone as appropriate  - Will continue to follow with you

## 2022-04-26 NOTE — PROGRESS NOTE ADULT - PROBLEM SELECTOR PLAN 4
.  alb 1.7  Clinical evidence indicates that the patient has Severe protein calorie malnutrition/ 3rd degree    In context of     Acute Illness/Injury (>7days)    vs    Chronic Illness (>1 month)    Energy/Food intake <50% of estimated energy requirement >5 days  Weight loss: Moderate - severe   Body Fat loss: Severe   (Cachexia, temporal wasting, muscle atrophy)  Muscle mass loss: Severe  (Skin failure/pressure ulcers)   Strength: weakened severe (bedbound)    Recommend:   - severe dysphagia per SS eval  - nutritional supplements as tolerated, nutrition consult  - PEG is Not within GOC -- 4/35 GOC note

## 2022-04-26 NOTE — PROGRESS NOTE ADULT - ASSESSMENT
81 year old M w/ PMHx of colon CA with mets (on decadron 2 mg BID for pain and encorafenib 75 mg QD), urinary retention 2/2 BPH with chronic indwelling cam, UTI recently discharged from [facility] ED on 4/13 after cam replacement, HTN who presents from nursing home for AMS, fever, and hypotension. ICU consulted for septic shock. Required pressors for 24 hrs but was able to be weaned off levo; c/b by hypotension and placed on midodrine 5mg TID on 4/25 and MAPs 70+. Stable for RMF on 4/26.     NEURO  #Toxic Metabolic Encephalopathy  Pt meets sepsis (3/4 SIRS criteria); s/p 6 L IVF. Baseline AOx2-3 per NH records, currently awake alert, verbal but unable to answer questions when prompted. Etiology most likely 2/2 to sepsis and dehydration due to acute change in mental status.   - Treat sepsis as below   - NPO pending improvement in mental status    CARDIOVASCULAR  # Shock  Patient in septic shock 2/2 gram negative bacteremia with underlying source UTI vs sacral ulcer, currently on levo gtt and maintaining MAP >65. Unknown hx of HF. Per NH records, no hx of CAD. EKG shows NSR w/ no ischemia/infarcts.   - Off pressors and maintaining MAP 65+  - Cont to monitor     PULMONARY   No acute issues, patient on room air, no signs or symptoms of increased work of breathing, and maintains oxygen saturation     GASTROINTESTINAL  #Transaminitis - DOWNTRENDING   Etiology of transaminitis likely septic shock  - Continue to monitor     #Hx of Crohn's disease and colon CA  S/p R hemicolectomy and liver abalation (unknown date)  - No acute interventions     RENAL  #BRAN  Patient with non-oliguric BRAN, BUN/Cr 31/0.90 -> 49/1.23 -> 43/0.91, etiology likely pre-renal in setting of septic shock, s/p 6 L IVF, overall BRAN stable, pt not oliguric.   - Continue fluids and monitor kidney function.      ENDOCRINOLOGY  WU  - mISS    HEME  #Anemia  Most likely 2/2 AOCD from Crohn's disease and colon CA.   - Transfuse Hgb <7  - Cont to monitor    ID  #Septic Shock  Patient presenting with SBP in 70's, tachycardic, with leukocytosis (end organ damage including BRAN and transmanitis). Source of shock could be 2/2 to uti (last visit was growing e coli and proteus, unclear whether patient completed course of PO cefpodoxime upon discharge) vs sacral wounds. On admission patient encephalopathic, cool upper extremities with dusky fingers, large necrotic sacral wounds ( [id number] sacral deep pressure injury, stage 4, and smaller 5x5 cm ulcer). S/p 4 L crystalloid solution, vanc, and cefepime in ED. Bedside US with A lines throughout, no overt reduction in cardiac fx, with very collapsable IVC.  Overall mental status improving, lactate negative.  BCx with gram negative rods.  - C/w ceftriaxone 2g q12 for UTI coverage (both e coli and proteus were sensitive on previous admission)   - D/gopal vancomycin 4/25   - Patient on decadron 2 mg BID for pain 2/2 to colon ca with mets. in setting of shock stress dosed steroids (load with 100 mg, then 50mg q6, followed by 50mg q8hrs); switched to home dose on 4/25  - Blood cx positive for bacteroides fragils, switched to flagyl 500 mg Q8hrs (4/25-)    DERM  #Sacral Wounds  Pt has large necrotic sacral wounds, possible cause of septic shock. S/p bedside debridement by surgery.    - C/w vanc (dose by trough) for soft tissue infection  - C/w dilaudid for pain  - Consult wound care, appreciate recs   - Consulted surgery, appreciate recs    PROPHYLAXIS  F: s/p 6 L IVF  E: replete PRN  N: NPO pending dysphagia screen   Lines: 2 peripheral IVs and cam (prior to admission)   Dispo: MICU   81 year old M w/ PMHx of colon CA with mets (on decadron 2 mg BID for pain and encorafenib 75 mg QD), urinary retention 2/2 BPH with chronic indwelling cam, UTI recently discharged from [facility] ED on 4/13 after cam replacement, HTN who presents from nursing home for AMS, fever, and hypotension. ICU consulted for septic shock. Required pressors for 24 hrs but was able to be weaned off levo; c/b by hypotension and placed on midodrine 5mg TID on 4/25 and MAPs 70+. Stable for RMF on 4/26.     NEURO  #Toxic Metabolic Encephalopathy  Pt met sepsis (3/4 SIRS criteria) 2/2 bacteremia, bacteroides fragilis; s/p 6 L IVF. Baseline AOx2-3 per NH records, currently awake alert, verbal but wishes not to be fully examined; when out of the room, per nursing noticed that he is talking to someone else. Etiology most likely 2/2 to sepsis and dehydration due to acute change in mental status.   - Treat sepsis as below     CARDIOVASCULAR  # Shock - RESOLVED  Patient in septic shock 2/2 gram negative bacteremia with underlying source UTI vs sacral ulcer, currently on levo gtt and maintaining MAP >65. Unknown hx of HF. Per NH records, no hx of CAD. EKG shows NSR w/ no ischemia/infarcts.   - Off pressors and maintaining MAP 65+  - Cont to monitor     #Hypotension  BP low with a MAP ~60-63. Started on midodrine 5mg TID on 4/25. BP has been normotensive for patient w/ MAP 70+  - C/w midodrine 5mg TID     PULMONARY   No acute issues, patient on room air, no signs or symptoms of increased work of breathing, and maintains oxygen saturation     GASTROINTESTINAL  #Transaminitis - DOWNTRENDING   Etiology of transaminitis likely septic shock  - Continue to monitor     #Hx of Crohn's disease and colon CA  S/p R hemicolectomy and liver abalation (unknown date)  - No acute interventions     RENAL  #BRAN  Patient with non-oliguric BRAN, BUN/Cr 31/0.90 -> 49/1.23 -> 43/0.91, etiology likely pre-renal in setting of septic shock, s/p 6 L IVF, overall BRAN stable, pt not oliguric. Kidney function improving. Started on pureed diet but seems like pt is having some difficulty.  - Consider maintenance fluids while NPO     ENDOCRINOLOGY  WU  - mISS    HEME  #Anemia  Most likely 2/2 AOCD from Crohn's disease and colon CA.   - Transfuse Hgb <7  - Cont to monitor    ID  #Septic Shock  Patient presenting with SBP in 70's, tachycardic, with leukocytosis (end organ damage including BRAN and transmanitis). Source of shock could be 2/2 to uti (last visit was growing e coli and proteus, unclear whether patient completed course of PO cefpodoxime upon discharge) vs sacral wounds. On admission patient encephalopathic, cool upper extremities with dusky fingers, large necrotic sacral wounds ( [id number] sacral deep pressure injury, stage 4, and smaller 5x5 cm ulcer). S/p 4 L crystalloid solution, vanc, and cefepime in ED. Bedside US with A lines throughout, no overt reduction in cardiac fx, with very collapsable IVC.  Overall mental status improving, lactate negative.  BCx with gram negative rods.  - C/w ceftriaxone 2g q12 for UTI coverage (both e coli and proteus were sensitive on previous admission)   - D/gopal vancomycin 4/25   - Patient on decadron 2 mg BID for pain 2/2 to colon ca with mets. in setting of shock stress dosed steroids (load with 100 mg, then 50mg q6, followed by 50mg q8hrs); switched to home dose on 4/26  - Blood cx positive for bacteroides fragils, switched to flagyl 500 mg Q8hrs (4/25-)    DERM  #Sacral Wounds  Pt has large necrotic sacral wounds, possible cause of septic shock. S/p bedside debridement by surgery.    - C/w vanc (dose by trough) for soft tissue infection  - C/w dilaudid for pain  - Consult wound care, appreciate recs   - Consulted surgery, appreciate recs    PROPHYLAXIS  F: s/p 6 L IVF  E: replete PRN  N: NPO pending SS   Lines: 2 peripheral IVs and cam (prior to admission)   Dispo: MICU -> [id number]

## 2022-04-26 NOTE — PROGRESS NOTE ADULT - PROBLEM SELECTOR PLAN 2
Patient with non-oliguric BRAN, BUN/Cr 31/0.90 -> 49/1.23 -> 43/0.91, etiology likely pre-renal in setting of septic shock, s/p 6 L IVF, overall BRAN stable, pt not oliguric.   - Continue fluids and monitor kidney function. Pt meets sepsis (3/4 SIRS criteria); s/p 6 L IVF. Baseline AOx2-3 per NH records, currently awake alert, verbal but unable to answer questions when prompted. Etiology most likely 2/2 to sepsis and dehydration due to acute change in mental status.   - Treat sepsis as below   - NPO pending improvement in mental status

## 2022-04-26 NOTE — ADVANCED PRACTICE NURSE CONSULT - ASSESSMENT
Spoke with patient's HCP June Berger at 9:10 am to inform her of the severity of the patient's pressure injuries upon admission to [facility]. Received HCP's verbal consent for photography of wounds, consent witnessed by Jackeline Le RN, .

## 2022-04-26 NOTE — PROGRESS NOTE ADULT - PROBLEM SELECTOR PLAN 8
Hx of Crohn's disease and colon CA  S/p R hemicolectomy and liver abalation (unknown date)  - No acute interventions F: none  E: replete to K>4, Mg>2, Phos>2.5  N: NPO  Ppx:    Code Status: DNR    Dispo: [facility]

## 2022-04-26 NOTE — PROGRESS NOTE ADULT - SUBJECTIVE AND OBJECTIVE BOX
SUBJECTIVE: Patient seen and examined bedside. Patient says he has intense pain on his left shoulder. Doesn't want to move it. He has no abdominal pain or back pain    metroNIDAZOLE  IVPB 500 milliGRAM(s) IV Intermittent every 8 hours  midodrine 5 milliGRAM(s) Oral every 8 hours      Vital Signs Last 24 Hrs  T(C): 36.3 (26 Apr 2022 13:00), Max: 36.6 (25 Apr 2022 18:29)  T(F): 97.4 (26 Apr 2022 13:00), Max: 97.9 (25 Apr 2022 18:29)  HR: 96 (26 Apr 2022 13:00) (82 - 96)  BP: 111/71 (26 Apr 2022 13:00) (83/48 - 112/58)  BP(mean): 72 (26 Apr 2022 12:10) (61 - 81)  RR: 17 (26 Apr 2022 13:00) (9 - 33)  SpO2: 95% (26 Apr 2022 13:00) (95% - 100%)  I&O's Detail    25 Apr 2022 07:01  -  26 Apr 2022 07:00  --------------------------------------------------------  IN:    Albumin 5%  - 250 mL: 250 mL    IV PiggyBack: 700 mL    Lactated Ringers Bolus: 500 mL  Total IN: 1450 mL    OUT:    Indwelling Catheter - Urethral (mL): 401 mL  Total OUT: 401 mL    Total NET: 1049 mL      26 Apr 2022 07:01  -  26 Apr 2022 15:05  --------------------------------------------------------  IN:    IV PiggyBack: 100 mL  Total IN: 100 mL    OUT:    Indwelling Catheter - Urethral (mL): 60 mL  Total OUT: 60 mL    Total NET: 40 mL    Physical Exam:  General: NAD, resting comfortably in bed  Pulmonary: Nonlabored breathing, no respiratory distress  Cardiovascular: NSR  Abdominal: soft, no abdominal tenderness, ND  Back: Stage L shoulder ulcer stage II and eschar on the upper half, ulcer of the left gluteus undetermined stage. Large stage IV sacral decub ulcer w/ necrosis and foul smell but no pus or other sign of infection, no edema of the perineum or tenderness  Extremities: WWP, normal strength        LABS:                        7.2    17.51 )-----------( 42       ( 26 Apr 2022 05:31 )             23.9     04-26    136  |  105  |  50<H>  ----------------------------<  88  4.7   |  20<L>  |  0.85    Ca    8.0<L>      26 Apr 2022 05:31  Phos  4.3     04-26  Mg     2.2     04-26    TPro  5.2<L>  /  Alb  2.5<L>  /  TBili  0.4  /  DBili  x   /  AST  79<H>  /  ALT  73<H>  /  AlkPhos  344<H>  04-26          RADIOLOGY & ADDITIONAL STUDIES:

## 2022-04-26 NOTE — PROGRESS NOTE ADULT - PROBLEM SELECTOR PLAN 5
Transaminitis - DOWNTRENDING   Etiology of transaminitis likely septic shock  - Continue to monitor Most likely 2/2 AOCD from Crohn's disease and colon CA. Hb stable.   - Transfuse Hgb <7  - Cont to monito

## 2022-04-26 NOTE — PROGRESS NOTE ADULT - ASSESSMENT
81 year old M w/ PMHx of colon CA with mets (on decadron 2 mg BID for pain and encorafenib 75 mg QD), urinary retention 2/2 BPH with chronic indwelling cam, UTI recently discharged from [facility] ED on 4/13 after cam replacement, HTN who presents from nursing home for AMS, fever, and hypotension. ICU consulted for septic shock.

## 2022-04-26 NOTE — PROGRESS NOTE ADULT - PROBLEM SELECTOR PLAN 3
.  admitted with the following pressure injuries: Upper back unstageable pressure injury measuring 13 x 12, Left hip with a deep tissue injury measuring 11.5 cm x 7.5 cm, right heel deep tissue injury measuring 3x 4.5 cm. Left ischial tuberosity unstageable measuring 8 x 9 cm,  Sacrum with Stage 4 pressure measuring 14 x 12 x 4 cm  - sepsis likely 2/2 seeding from wound  - appreciative surg and wound care recs   - pain management as above   - pending transfer to [facility] for EOL care and Wound care

## 2022-04-26 NOTE — SWALLOW BEDSIDE ASSESSMENT ADULT - COMMENTS
Pt agitated, sensitive to touch, did not allow consistent palpation during evaluation. Required consistent encouragement to participate in evaluation.

## 2022-04-26 NOTE — SWALLOW BEDSIDE ASSESSMENT ADULT - NS SPL SWALLOW CLINIC TRIAL FT
Oral stage significant for prolonged oral manipulation of ice chip and puree, with delayed AP transit across consistencies.   Pharyngeal stage significant for suspected delay in swallow initiation, multiple swallows which may indicate pharyngeal bolus clearance deficits, inconsistent overt signs of airway protection deficits across trials. Above presentation in combination with agitation, and weak cough place pt at high aspiration risk. Recommend: NPO with alternative means of nutrition, hydration, and medication. This svc will reassess in 24 hours.

## 2022-04-26 NOTE — PROGRESS NOTE ADULT - PROBLEM SELECTOR PLAN 6
Most likely 2/2 AOCD from Crohn's disease and colon CA.   - Transfuse Hgb <7  - Cont to monito Pt has large necrotic sacral wounds, possible cause of septic shock. S/p bedside debridement by surgery.    - C/w vanc (dose by trough) for soft tissue infection  - C/w dilaudid for pain  - Consult wound care, appreciate recs   - Consulted surgery, appreciate recs Pt has large necrotic sacral wounds, possible cause of septic shock. S/p bedside debridement by surgery.    - see sepsis management above  - C/w dilaudid for pain  - Consult wound care, appreciate recs   - Consulted surgery, appreciate recs Most likely 2/2 AOCD from Crohn's disease and colon CA. Hb stable.   - Transfuse Hgb <7  - Cont to monito

## 2022-04-26 NOTE — PROGRESS NOTE ADULT - PROBLEM SELECTOR PLAN 1
.  extensive wounds  - cw dilaudid 0.5 mg IV q6 ATC  - dc dilaudid 0.25 IV PRN as it us subtherapeutic  - start dilaudid 0.5 mg IV q2 PRN mod pain or RR>20  - Low threshold to repeat dose or increase PRN frequency to maximize comfort at EOL

## 2022-04-26 NOTE — PROGRESS NOTE ADULT - PROBLEM SELECTOR PLAN 5
.  metastatic, poor PS, not candidate for further therapy  - prognosis days/weeks   - pending transfer to [facility] for EOL symptom management and wound care

## 2022-04-26 NOTE — PROGRESS NOTE ADULT - PROBLEM SELECTOR PLAN 7
Pt has large necrotic sacral wounds, possible cause of septic shock. S/p bedside debridement by surgery.    - C/w vanc (dose by trough) for soft tissue infection  - C/w dilaudid for pain  - Consult wound care, appreciate recs   - Consulted surgery, appreciate recs Hx of Crohn's disease and colon CA  S/p R hemicolectomy and liver abalation (unknown date)  - No acute interventions Pt has large necrotic sacral wounds, possible cause of septic shock. S/p bedside debridement by surgery.    - see sepsis management above  - C/w dilaudid for pain  - Consult wound care, appreciate recs   - Consulted surgery, appreciate recs

## 2022-04-26 NOTE — PROGRESS NOTE ADULT - ASSESSMENT
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 Admitted to MICU for septic shock 2/2 large necrotic sacral wound. Stepped down to RMF today. Poor candidate for further DMT. Palliative care consulted for GOC.     ·	GOC 4/25 note: DNR DNI.  No PEG.   ·	Reviewed current clinical status and reiterated pts poor long term prognosis and unlikelihood to receive further disease modifying treatments  ·	Shared decision made to being transition to care with exclusive focus on comfort and place referral for transfer to [facility] for EOL and wound care  ·	dc dilaudid 0.25 mg IV PRN and start dilaudid 0.5 mg IV q2 PRN moderate pain or RR>20  ·	Mews exempt  ·	Continue IVF and abx  ·	Oral care only, if awake alert and willing to eat food for comfort

## 2022-04-26 NOTE — SWALLOW BEDSIDE ASSESSMENT ADULT - PHARYNGEAL PHASE
Delayed pharyngeal swallow/Wet vocal quality post oral intake/Cough post oral intake/Throat clear post oral intake/Delayed cough post oral intake/Delayed throat clear post oral intake/Multiple swallows

## 2022-04-26 NOTE — PROGRESS NOTE ADULT - SUBJECTIVE AND OBJECTIVE BOX
SUBJECTIVE: overnight events noted. pt stepped down to 7u. SS eval:  agitated, sensitive to touch, did not allow consistent palpation during evaluation. Required consistent encouragement to participate in evaluation. Pt seen and examined today. Weak and lethargic. Dilaudid 0.25 mg IV x 1 in last 24hs. GOC further discussed today with HCP June, shared decision to transition to care with focus on comfort, liberalize PRN use to maximize comfort through EOL, and move forward w referral to to [facility] for wound and EOL care.       DNR in chart: Yes      ROS limited due to patient mentation.     PEx:  T(C): 36.3 (04-26-22 @ 13:00), Max: 36.6 (04-25-22 @ 18:29)  HR: 96 (04-26-22 @ 13:00) (82 - 96)  BP: 111/71 (04-26-22 @ 13:00) (83/48 - 112/58)  RR: 17 (04-26-22 @ 13:00) (9 - 33)  SpO2: 95% (04-26-22 @ 13:00) (95% - 100%)  Wt(kg): --    General: oriented x name lethargic, chronically ill and weak appearing   HEENT: atraumatic, No abnormal lesions, +dry mouth, + temporal wasting,   RESP: Reg rhythm,  No tachypnea/labored breathing, No   audible excessive secretions, diminished bilat bases  CV: RRR, S1S2,  No  tachycardia  GI: soft non distended non tender  incontinent   : oliguria  cam  MUSK: weakness x4,  fully  BB bound  SKIN: Stage II L shoulder ulcer , unstageable sacral ulcer   NEURO:  + encephalopathic, inattention difficulty following commands       Oral intake ability: unable/only mouth care,    Palliative Performance Scale/Karnofsky Score: 20  ECOG Performance: 4    ALLERGIES: penicillin (Unknown)  sulfamethoxazole (Unknown)      MEDICATIONS: REVIEWED  MEDICATIONS  (STANDING):  chlorhexidine 2% Cloths 1 Application(s) Topical <User Schedule>  dexAMETHasone     Tablet 2 milliGRAM(s) Oral every 12 hours  dextrose 5%. 1000 milliLiter(s) (50 mL/Hr) IV Continuous <Continuous>  dextrose 5%. 1000 milliLiter(s) (100 mL/Hr) IV Continuous <Continuous>  dextrose 50% Injectable 25 Gram(s) IV Push once  dextrose 50% Injectable 12.5 Gram(s) IV Push once  dextrose 50% Injectable 25 Gram(s) IV Push once  glucagon  Injectable 1 milliGRAM(s) IntraMuscular once  HYDROmorphone  Injectable 0.5 milliGRAM(s) IV Push every 4 hours  insulin lispro (ADMELOG) corrective regimen sliding scale   SubCutaneous every 6 hours  metroNIDAZOLE  IVPB 500 milliGRAM(s) IV Intermittent every 8 hours  midodrine 5 milliGRAM(s) Oral every 8 hours  pantoprazole    Tablet 40 milliGRAM(s) Oral before breakfast  polyethylene glycol 3350 17 Gram(s) Oral daily    MEDICATIONS  (PRN):  dextrose Oral Gel 15 Gram(s) Oral once PRN Blood Glucose LESS THAN 70 milliGRAM(s)/deciliter  HYDROmorphone  Injectable 0.25 milliGRAM(s) IV Push every 4 hours PRN Moderate Pain (4 - 6)      CRITICAL CARE:  [ ]Shock Present  [ ]Septic [ ]Cardiogenic [ ]Neurologic [ ]Hypovolemic    [ ]Vasopressors [ ]Inotropes    [ ]Respiratory failure present   [ ]Mechanical Ventilation   [  ]Non-invasive ventilatory support   [ ]High-Flow  [ ]Acute  [ ]Chronic   [ ]Hypoxic  [ ]Hypercarbic   [ ]Other  [ ]Other organ failure     LABS: REVIEWED  CBC:                        7.2    17.51 )-----------( 42       ( 26 Apr 2022 05:31 )             23.9     CMP:    04-26    136  |  105  |  50<H>  ----------------------------<  88  4.7   |  20<L>  |  0.85    Ca    8.0<L>      26 Apr 2022 05:31  Phos  4.3     04-26  Mg     2.2     04-26    TPro  5.2<L>  /  Alb  2.5<L>  /  TBili  0.4  /  DBili  x   /  AST  79<H>  /  ALT  73<H>  /  AlkPhos  344<H>  04-26      RADIOLOGY & ADDITIONAL STUDIES:     PROTEIN CALORIE MALNUTRITION PRESENT:  [ ]mild [ ]moderate [x]severe [ ]underweight [ ]morbid obesity  [x ] PPSV2 < or = 30%   [x ] significant weight loss   [x ] poor nutritional intake   [ ] anasarca   [ ] catabolic state      [ ] Artificial Nutrition     PSYCHOSOCIAL ASSESSMENT:  - Significant other/partner: [ x ] long term gf Brittany Children: [  ]  - Living Situation: Home [  ] Long term care [  ]  Rehab[ x ]  Other[  ]  - Support system: strong[  ] adequate[  ] inadequate[x  ]    DISCUSSION OF CASE:  - discussion with HCP sister June, in agreement with transition to care with focus on comfort, and referral for inpatient EOL and wound care at [facility]       CARE COORDINATION:   - please see hedy Martines Care Coordination note

## 2022-04-26 NOTE — PROGRESS NOTE ADULT - PROBLEM SELECTOR PLAN 2
.  ss cw severe dysphagia  - 4/25 GOC note: PEG is Not within GOC  - oral care ATV  - food for comfort with strict asp precautions if pt is awake willing to eat

## 2022-04-26 NOTE — PROGRESS NOTE ADULT - SUBJECTIVE AND OBJECTIVE BOX
***TRANSFER NOTE 7EAST TO 7URIS***  Hospital Course    INTERVAL/OVERNIGHT EVENTS    SUBJECTIVE    OBJECTIVE  ICU Vital Signs Last 24 Hrs  T(C): 36.1 (26 Apr 2022 10:00), Max: 36.6 (25 Apr 2022 18:29)  T(F): 97 (26 Apr 2022 10:00), Max: 97.9 (25 Apr 2022 18:29)  HR: 86 (26 Apr 2022 10:00) (82 - 95)  BP: 96/60 (26 Apr 2022 10:00) (83/48 - 113/56)  BP(mean): 72 (26 Apr 2022 10:00) (61 - 81)  ABP: --  ABP(mean): --  RR: 11 (26 Apr 2022 10:00) (9 - 33)  SpO2: 100% (26 Apr 2022 10:00) (95% - 100%) GURVINDER    I&O's Summary    25 Apr 2022 07:01  -  26 Apr 2022 07:00  --------------------------------------------------------  IN: 1450 mL / OUT: 401 mL / NET: 1049 mL    26 Apr 2022 07:01  -  26 Apr 2022 11:01  --------------------------------------------------------  IN: 100 mL / OUT: 40 mL / NET: 60 mL      PHYSICAL EXAM  GENERAL: resting comfortably, no acute distress   HEAD:  Atraumatic, Normocephalic  EYES: PERRLA, conjunctiva and sclera clear  NECK: Supple, No JVD, Normal thyroid, no enlarged nodes  NERVOUS SYSTEM:  Alert & Awake, oriented x0, not verbalizing or answering questions, not following comamnds   CHEST/LUNG: B/L good air entry; No rales, rhonchi, or wheezing  HEART: S1S2 normal, no S3, Regular rate and rhythm; No murmurs  ABDOMEN: Soft, Nontender, Nondistended; Bowel sounds present  EXTREMITIES:  2+ Peripheral Pulses, No clubbing, cyanosis, or edema  LYMPH: No lymphadenopathy noted  SKIN: No rashes or lesions    Labs                        7.2    17.51 )-----------( 42       ( 26 Apr 2022 05:31 )             23.9     04-26    136  |  105  |  50<H>  ----------------------------<  88  4.7   |  20<L>  |  0.85    Ca    8.0<L>      26 Apr 2022 05:31  Phos  4.3     04-26  Mg     2.2     04-26    TPro  5.2<L>  /  Alb  2.5<L>  /  TBili  0.4  /  DBili  x   /  AST  79<H>  /  ALT  73<H>  /  AlkPhos  344<H>  04-26    RADIOLOGY & ADDITIONAL TESTS REVIEWED: yes    ***TRANSFER NOTE 7EAST TO 7URIS***  Hospital Course  This is a 80 y/o M with PMHx of colon CA, BPH urinary retention with cam (recently replaced 4/13 at  ED), HTN who presents from nursing home for altered mental status, fever, and sepsis; admitted due to septic shock requiring pressors.  As per transfer notes, pt had temp of 101.7 with BP in 70s the morning of admission and was less responsive than usual. Pt recently treated with E.Coli UTI dx from [facility] ED on 4/13 as culture was sensitive to cefpodoxime (unknown if he completed course). Unable to obtain history from patient as pt is altered and septic.  Pt accompanied by [facility]OLST confirming DNR/DNI. Surgery has been seeing the patient everyday for pressure wound dressings. As of Sunday, 4/24, the patient hasn't needed any pressors. He has been receiving 5% albumin PRN due to decreased UOP and started on midodrine 5 mg TID. Pt was stable on 4/26 for RMF.     INTERVAL/OVERNIGHT EVENTS: pt asking for more pain meds on top of PRN dilaudid. UOP ~15cc/hr.    SUBJECTIVE: pt in no acute distress; complaining of R knee pain.    OBJECTIVE  ICU Vital Signs Last 24 Hrs  T(C): 36.1 (26 Apr 2022 10:00), Max: 36.6 (25 Apr 2022 18:29)  T(F): 97 (26 Apr 2022 10:00), Max: 97.9 (25 Apr 2022 18:29)  HR: 94 (26 Apr 2022 12:10) (82 - 95)  BP: 97/56 (26 Apr 2022 12:10) (83/48 - 113/56)  RR: 17 (26 Apr 2022 12:10) (9 - 33)  SpO2: 100% (26 Apr 2022 12:10) (96% - 100%) on RA     I&O's Summary    25 Apr 2022 07:01  -  26 Apr 2022 07:00  --------------------------------------------------------  IN: 1450 mL / OUT: 401 mL / NET: 1049 mL    26 Apr 2022 07:01  -  26 Apr 2022 12:53  --------------------------------------------------------  IN: 100 mL / OUT: 60 mL / NET: 40 mL      I&O's Summary    25 Apr 2022 07:01  -  26 Apr 2022 07:00  --------------------------------------------------------  IN: 1450 mL / OUT: 401 mL / NET: 1049 mL    26 Apr 2022 07:01  -  26 Apr 2022 11:01  --------------------------------------------------------  IN: 100 mL / OUT: 40 mL / NET: 60 mL      PHYSICAL EXAM  GENERAL: resting comfortably, no acute distress   HEAD:  Atraumatic, Normocephalic  EYES: PERRLA, conjunctiva and sclera clear  NECK: Supple, No JVD, Normal thyroid, no enlarged nodes  CHEST/LUNG: B/L good air entry; No rales, rhonchi, or wheezing  HEART: S1S2 normal, no S3, Regular rate and rhythm; No murmurs  ABDOMEN: Soft, Nontender, Nondistended; Bowel sounds present  EXTREMITIES:  2+ Peripheral Pulses, No clubbing, cyanosis, or edema  LYMPH: No lymphadenopathy noted  SKIN: No rashes or lesions  NERVOUS SYSTEM:  Alert & Awake, oriented x1-2, answering questions, visual hallucinations     Labs                        7.2    17.51 )-----------( 42       ( 26 Apr 2022 05:31 )             23.9     04-26    136  |  105  |  50<H>  ----------------------------<  88  4.7   |  20<L>  |  0.85    Ca    8.0<L>      26 Apr 2022 05:31  Phos  4.3     04-26  Mg     2.2     04-26    TPro  5.2<L>  /  Alb  2.5<L>  /  TBili  0.4  /  DBili  x   /  AST  79<H>  /  ALT  73<H>  /  AlkPhos  344<H>  04-26    RADIOLOGY & ADDITIONAL TESTS REVIEWED: yes

## 2022-04-26 NOTE — PROGRESS NOTE ADULT - PROBLEM SELECTOR PLAN 1
Patient presenting with SBP in 70's, tachycardic, with leukocytosis (end organ damage including BRAN and transmanitis). Source of shock could be 2/2 to uti (last visit was growing e coli and proteus, unclear whether patient completed course of PO cefpodoxime upon discharge) vs sacral wounds. On admission patient encephalopathic, cool upper extremities with dusky fingers, large necrotic sacral wounds ( [id number] sacral deep pressure injury, stage 4, and smaller 5x5 cm ulcer). S/p 4 L crystalloid solution, vanc, and cefepime in ED. Bedside US with A lines throughout, no overt reduction in cardiac fx, with very collapsable IVC.  Overall mental status improving, lactate negative.  BCx with gram negative rods.  - C/w ceftriaxone 2g q12 for UTI coverage (both e coli and proteus were sensitive on previous admission)   - D/gopal vancomycin 4/25   - Patient on decadron 2 mg BID for pain 2/2 to colon ca with mets. in setting of shock stress dosed steroids (load with 100 mg, then 50mg q6, followed by 50mg q8hrs); switched to home dose on 4/25  - Blood cx positive for bacteroides fragils, switched to flagyl 500 mg Q8hrs (4/25-) Patient presenting with SBP in 70's, tachycardic, with leukocytosis (end organ damage including BRAN and transmanitis). Source of shock could be 2/2 to uti (last visit was growing e coli and proteus, unclear whether patient completed course of PO cefpodoxime upon discharge) vs sacral wounds. On admission patient encephalopathic, cool upper extremities with dusky fingers, large necrotic sacral wounds ( [id number] sacral deep pressure injury, stage 4, and smaller 5x5 cm ulcer). S/p 4 L crystalloid solution, vanc, and cefepime in ED. Bedside US with A lines throughout, no overt reduction in cardiac fx, with very collapsable IVC.  Overall mental status improving, lactate negative.  BCx with gram negative rods.  - C/w ceftriaxone 2g q12 for UTI coverage (both e coli and proteus were sensitive on previous admission)   - D/gopal vancomycin 4/25   - c/w home decadron 2mg q12hr (restarted 4/25)  - Blood cx positive for bacteroides fragilis, switched to flagyl 500 mg Q8hrs (4/25-) Patient presenting with SBP in 70's, tachycardic, with leukocytosis (end organ damage including BRAN and transmanitis). Source of shock could be 2/2 to uti (last visit was growing e coli and proteus, unclear whether patient completed course of PO cefpodoxime upon discharge) vs sacral wounds. On admission patient encephalopathic, cool upper extremities with dusky fingers, large necrotic sacral wounds ( [id number] sacral deep pressure injury, stage 4, and smaller 5x5 cm ulcer). S/p 4 L crystalloid solution, vanc, and cefepime in ED. Bedside US with A lines throughout, no overt reduction in cardiac fx, with very collapsable IVC.  Overall mental status improving, lactate negative.  BCx with gram negative rods.  - C/w ceftriaxone 2g q12 for 7 days (4/24-4/30)  - D/gopal vancomycin 4/25   - c/w home decadron 2mg q12hr (restarted 4/25)  - Blood cx positive for bacteroides fragilis, switched to flagyl 500 mg Q8hrs (4/25-)

## 2022-04-26 NOTE — PROGRESS NOTE ADULT - ATTENDING COMMENTS
colon cancer with septic shock from Bacteroides fragilis bacteremia, UTI  physical as above  continue metronidazole; ? translocation  continue ceftriaxone to cover UTI  pain control  BP improved on midodrine  renal, hepatic and neuro function all improving

## 2022-04-26 NOTE — PROGRESS NOTE ADULT - SUBJECTIVE AND OBJECTIVE BOX
TRANSFER FROM 7EAST TO 7URIS2 TRANSFER FROM [facility] TO [facility]      *INCOMPLETE NOTE*    Hospital course:    INTERVAL HPI/OVERNIGHT EVENTS:  As per night team, no overnight events. Patient seen and examined at bedside. Patient denies fever, chills, dizziness, weakness, HA, CP, SOB, N/V/D/C    VITALS  Vital Signs Last 24 Hrs  T(C): 36.1 (26 Apr 2022 10:00), Max: 36.6 (25 Apr 2022 18:29)  T(F): 97 (26 Apr 2022 10:00), Max: 97.9 (25 Apr 2022 18:29)  HR: 92 (26 Apr 2022 12:00) (82 - 95)  BP: 86/50 (26 Apr 2022 12:00) (83/48 - 113/56)  BP(mean): 63 (26 Apr 2022 12:00) (61 - 81)  RR: 24 (26 Apr 2022 12:00) (9 - 33)  SpO2: 100% (26 Apr 2022 12:00) (96% - 100%)    CAPILLARY BLOOD GLUCOSE      POCT Blood Glucose.: 94 mg/dL (26 Apr 2022 11:34)  POCT Blood Glucose.: 89 mg/dL (26 Apr 2022 06:53)  POCT Blood Glucose.: 94 mg/dL (25 Apr 2022 23:21)      PHYSICAL EXAM  General: NAD, sitting comfortably in bed   HEENT: PERRL/ EOMI, no scleral icterus, MMM  Neck: Supple, no JVD  Respiratory: lungs CTA b/l, no wheezes/crackles, no accessory muscle use  Cardiovascular: Regular rhythm/rate; +S1 +S2, no murmurs  Gastrointestinal: Soft, NTND, normoactive BS, no rebound, no guarding  Genitourinary: no suprapubic tenderness  Extremities: WWP, no cyanosis, no clubbing, no edema, pulses equal  Neurological: A&Ox3, no gross focal deficits, follows commands  Skin: Normal temperature, warm, dry    MEDICATIONS  (STANDING):  cefTRIAXone   IVPB 2000 milliGRAM(s) IV Intermittent every 24 hours  chlorhexidine 2% Cloths 1 Application(s) Topical <User Schedule>  dexAMETHasone     Tablet 2 milliGRAM(s) Oral every 12 hours  dextrose 5%. 1000 milliLiter(s) (50 mL/Hr) IV Continuous <Continuous>  dextrose 5%. 1000 milliLiter(s) (100 mL/Hr) IV Continuous <Continuous>  dextrose 50% Injectable 25 Gram(s) IV Push once  dextrose 50% Injectable 12.5 Gram(s) IV Push once  dextrose 50% Injectable 25 Gram(s) IV Push once  glucagon  Injectable 1 milliGRAM(s) IntraMuscular once  HYDROmorphone  Injectable 0.5 milliGRAM(s) IV Push every 4 hours  insulin lispro (ADMELOG) corrective regimen sliding scale   SubCutaneous every 6 hours  metroNIDAZOLE  IVPB 500 milliGRAM(s) IV Intermittent every 8 hours  midodrine 5 milliGRAM(s) Oral every 8 hours  pantoprazole    Tablet 40 milliGRAM(s) Oral before breakfast  polyethylene glycol 3350 17 Gram(s) Oral daily    MEDICATIONS  (PRN):  dextrose Oral Gel 15 Gram(s) Oral once PRN Blood Glucose LESS THAN 70 milliGRAM(s)/deciliter  HYDROmorphone  Injectable 0.25 milliGRAM(s) IV Push every 4 hours PRN Moderate Pain (4 - 6)      penicillin (Unknown)  sulfamethoxazole (Unknown)      LABS                        7.2    17.51 )-----------( 42       ( 26 Apr 2022 05:31 )             23.9     04-26    136  |  105  |  50<H>  ----------------------------<  88  4.7   |  20<L>  |  0.85    Ca    8.0<L>      26 Apr 2022 05:31  Phos  4.3     04-26  Mg     2.2     04-26    TPro  5.2<L>  /  Alb  2.5<L>  /  TBili  0.4  /  DBili  x   /  AST  79<H>  /  ALT  73<H>  /  AlkPhos  344<H>  04-26              RADIOLOGY & ADDITIONAL TESTS: Reviewed TRANSFER FROM [facility] TO [facility]  This is a 80 y/o M with PMHx of colon CA, BPH urinary retention with cam (recently replaced 4/13 at  ED), HTN who presents from nursing home for altered mental status, fever, and sepsis; admitted due to septic shock requiring pressors.  As per transfer notes, pt had temp of 101.7 with BP in 70s the morning of admission and was less responsive than usual. Pt recently treated with E.Coli UTI dx from [facility] ED on 4/13 as culture was sensitive to cefpodoxime (unknown if he completed course). Unable to obtain history from patient as pt is altered and septic.  Pt accompanied by [facility] confirming DNR/DNI. Surgery has been seeing the patient everyday for pressure wound dressings. As of Sunday, 4/24, the patient hasn't needed any pressors. He has been receiving 5% albumin PRN due to decreased UOP and started on midodrine 5 mg TID. Pt was stable on 4/26 for RMF.       VITALS  Vital Signs Last 24 Hrs  T(C): 36.1 (26 Apr 2022 10:00), Max: 36.6 (25 Apr 2022 18:29)  T(F): 97 (26 Apr 2022 10:00), Max: 97.9 (25 Apr 2022 18:29)  HR: 92 (26 Apr 2022 12:00) (82 - 95)  BP: 86/50 (26 Apr 2022 12:00) (83/48 - 113/56)  BP(mean): 63 (26 Apr 2022 12:00) (61 - 81)  RR: 24 (26 Apr 2022 12:00) (9 - 33)  SpO2: 100% (26 Apr 2022 12:00) (96% - 100%)    CAPILLARY BLOOD GLUCOSE      POCT Blood Glucose.: 94 mg/dL (26 Apr 2022 11:34)  POCT Blood Glucose.: 89 mg/dL (26 Apr 2022 06:53)  POCT Blood Glucose.: 94 mg/dL (25 Apr 2022 23:21)      PHYSICAL EXAM  GENERAL: resting comfortably, no acute distress   HEAD:  Atraumatic, Normocephalic  EYES: PERRLA, conjunctiva and sclera clear  NECK: Supple, No JVD, Normal thyroid, no enlarged nodes  CHEST/LUNG: B/L good air entry; No rales, rhonchi, or wheezing  HEART: S1S2 normal, no S3, Regular rate and rhythm; No murmurs  ABDOMEN: Soft, Nontender, Nondistended; Bowel sounds present  EXTREMITIES:  2+ Peripheral Pulses, No clubbing, cyanosis, or edema  LYMPH: No lymphadenopathy noted  SKIN: No rashes or lesions  NERVOUS SYSTEM:  Alert & Awake, oriented x1-2, answering questions, visual hallucinations       MEDICATIONS  (STANDING):  cefTRIAXone   IVPB 2000 milliGRAM(s) IV Intermittent every 24 hours  chlorhexidine 2% Cloths 1 Application(s) Topical <User Schedule>  dexAMETHasone     Tablet 2 milliGRAM(s) Oral every 12 hours  dextrose 5%. 1000 milliLiter(s) (50 mL/Hr) IV Continuous <Continuous>  dextrose 5%. 1000 milliLiter(s) (100 mL/Hr) IV Continuous <Continuous>  dextrose 50% Injectable 25 Gram(s) IV Push once  dextrose 50% Injectable 12.5 Gram(s) IV Push once  dextrose 50% Injectable 25 Gram(s) IV Push once  glucagon  Injectable 1 milliGRAM(s) IntraMuscular once  HYDROmorphone  Injectable 0.5 milliGRAM(s) IV Push every 4 hours  insulin lispro (ADMELOG) corrective regimen sliding scale   SubCutaneous every 6 hours  metroNIDAZOLE  IVPB 500 milliGRAM(s) IV Intermittent every 8 hours  midodrine 5 milliGRAM(s) Oral every 8 hours  pantoprazole    Tablet 40 milliGRAM(s) Oral before breakfast  polyethylene glycol 3350 17 Gram(s) Oral daily    MEDICATIONS  (PRN):  dextrose Oral Gel 15 Gram(s) Oral once PRN Blood Glucose LESS THAN 70 milliGRAM(s)/deciliter  HYDROmorphone  Injectable 0.25 milliGRAM(s) IV Push every 4 hours PRN Moderate Pain (4 - 6)      penicillin (Unknown)  sulfamethoxazole (Unknown)      LABS                        7.2    17.51 )-----------( 42       ( 26 Apr 2022 05:31 )             23.9     04-26    136  |  105  |  50<H>  ----------------------------<  88  4.7   |  20<L>  |  0.85    Ca    8.0<L>      26 Apr 2022 05:31  Phos  4.3     04-26  Mg     2.2     04-26    TPro  5.2<L>  /  Alb  2.5<L>  /  TBili  0.4  /  DBili  x   /  AST  79<H>  /  ALT  73<H>  /  AlkPhos  344<H>  04-26              RADIOLOGY & ADDITIONAL TESTS: Reviewed

## 2022-04-26 NOTE — PROGRESS NOTE ADULT - ASSESSMENT
81M PMH colon cancer, BPH, HTN admitted from nursing home for AMS, fever to 102, sepsis of unknown origin with recent history of E. coli and Proteus UTI treated at [facility] on 4/13 completed course of abx. Pt underwent sacral decub IV ulcer debridement on 4/23 without purulence or fluctuance. Stage II L shoulder ulcer w/o purulence or fluctuance and left gluteus ulcer not staged. Ulcers unlikely to be source of sepsis and elevated WBC.     No erythema or purulence from sacral ulcer or signs of infection in the perineum or groin. WBC trending down. LFTs improving. Worsening thrombocytopenia     Recommendations   Wound care ostomy recommendations for pressure ulcers.   No need of further surgical debridement   Pressure offloading  Nutritional optimization  Rest of plan per primary  Team 4c general surgery will sign off. Contact us if you have questions or concerns.     Patient discussed with attending and chief residents

## 2022-04-26 NOTE — PROGRESS NOTE ADULT - PROBLEM SELECTOR PLAN 9
F: none  E: replete to K>4, Mg>2, Phos>2.5  N: NPO  Ppx:    Code Status: DNR    Dispo: [facility] F: none  E: replete to K>4, Mg>2, Phos>2.5  N: NPO  Ppx:    Code Status: DNR    Dispo: [facility]

## 2022-04-26 NOTE — PROGRESS NOTE ADULT - PROBLEM SELECTOR PLAN 3
Pt meets sepsis (3/4 SIRS criteria); s/p 6 L IVF. Baseline AOx2-3 per NH records, currently awake alert, verbal but unable to answer questions when prompted. Etiology most likely 2/2 to sepsis and dehydration due to acute change in mental status.   - Treat sepsis as below   - NPO pending improvement in mental status RESOLVED - Patient with non-oliguric BRAN, BUN/Cr 31/0.90 -> 49/1.23 -> 43/0.91, etiology likely pre-renal in setting of septic shock, s/p 6 L IVF, overall BRAN stable, pt not oliguric.   - Continue fluids and monitor kidney function. Pt with downtrending plt in the setting of lovenox 150-->42. No signs of bleeding  - f/u HIT w/u  - monitor for signs of bleeding Pt with downtrending plt in the setting of lovenox + vanc/zoysn. plt 150-->42. No signs of bleeding  - f/u HIT w/u, although low suspicion for HIT  - monitor for signs of bleeding

## 2022-04-26 NOTE — SWALLOW BEDSIDE ASSESSMENT ADULT - SPECIFY REASON(S)
Swallow evaluation to assess tolerance of PO diet. Pt currently receiving puree/thin diet, with nursing reporting inconsistent difficulty across consistencies.

## 2022-04-27 ENCOUNTER — TRANSCRIPTION ENCOUNTER (OUTPATIENT)
Age: 82
End: 2022-04-27

## 2022-04-27 VITALS
DIASTOLIC BLOOD PRESSURE: 58 MMHG | RESPIRATION RATE: 18 BRPM | HEART RATE: 99 BPM | OXYGEN SATURATION: 99 % | SYSTOLIC BLOOD PRESSURE: 92 MMHG | TEMPERATURE: 98 F

## 2022-04-27 LAB
-  AMOXICILLIN/CLAVULANIC ACID: SIGNIFICANT CHANGE UP
-  CLINDAMYCIN: SIGNIFICANT CHANGE UP
-  ERTAPENEM: SIGNIFICANT CHANGE UP
-  MEROPENEM: SIGNIFICANT CHANGE UP
-  METRONIDAZOLE: SIGNIFICANT CHANGE UP
-  PIPERACILLIN/TAZOBACTAM: SIGNIFICANT CHANGE UP
ANION GAP SERPL CALC-SCNC: 12 MMOL/L — SIGNIFICANT CHANGE UP (ref 5–17)
ANISOCYTOSIS BLD QL: SLIGHT — SIGNIFICANT CHANGE UP
BASOPHILS # BLD AUTO: 0 K/UL — SIGNIFICANT CHANGE UP (ref 0–0.2)
BASOPHILS NFR BLD AUTO: 0 % — SIGNIFICANT CHANGE UP (ref 0–2)
BLD GP AB SCN SERPL QL: NEGATIVE — SIGNIFICANT CHANGE UP
BUN SERPL-MCNC: 59 MG/DL — HIGH (ref 7–23)
CALCIUM SERPL-MCNC: 8.3 MG/DL — LOW (ref 8.4–10.5)
CHLORIDE SERPL-SCNC: 105 MMOL/L — SIGNIFICANT CHANGE UP (ref 96–108)
CO2 SERPL-SCNC: 20 MMOL/L — LOW (ref 22–31)
CREAT SERPL-MCNC: 1.04 MG/DL — SIGNIFICANT CHANGE UP (ref 0.5–1.3)
EGFR: 73 ML/MIN/1.73M2 — SIGNIFICANT CHANGE UP
EOSINOPHIL # BLD AUTO: 0 K/UL — SIGNIFICANT CHANGE UP (ref 0–0.5)
EOSINOPHIL NFR BLD AUTO: 0 % — SIGNIFICANT CHANGE UP (ref 0–6)
GLUCOSE BLDC GLUCOMTR-MCNC: 68 MG/DL — LOW (ref 70–99)
GLUCOSE BLDC GLUCOMTR-MCNC: 70 MG/DL — SIGNIFICANT CHANGE UP (ref 70–99)
GLUCOSE SERPL-MCNC: 67 MG/DL — LOW (ref 70–99)
HCT VFR BLD CALC: 29.6 % — LOW (ref 39–50)
HGB BLD-MCNC: 8.4 G/DL — LOW (ref 13–17)
HYPOCHROMIA BLD QL: SLIGHT — SIGNIFICANT CHANGE UP
LYMPHOCYTES # BLD AUTO: 0 % — LOW (ref 13–44)
LYMPHOCYTES # BLD AUTO: 0 K/UL — LOW (ref 1–3.3)
MACROCYTES BLD QL: SLIGHT — SIGNIFICANT CHANGE UP
MAGNESIUM SERPL-MCNC: 2.4 MG/DL — SIGNIFICANT CHANGE UP (ref 1.6–2.6)
MANUAL SMEAR VERIFICATION: SIGNIFICANT CHANGE UP
MCHC RBC-ENTMCNC: 23.3 PG — LOW (ref 27–34)
MCHC RBC-ENTMCNC: 28.4 GM/DL — LOW (ref 32–36)
MCV RBC AUTO: 82 FL — SIGNIFICANT CHANGE UP (ref 80–100)
METHOD TYPE: SIGNIFICANT CHANGE UP
MICROCYTES BLD QL: SLIGHT — SIGNIFICANT CHANGE UP
MONOCYTES # BLD AUTO: 0 K/UL — SIGNIFICANT CHANGE UP (ref 0–0.9)
MONOCYTES NFR BLD AUTO: 0 % — LOW (ref 2–14)
NEUTROPHILS # BLD AUTO: 25.5 K/UL — HIGH (ref 1.8–7.4)
NEUTROPHILS NFR BLD AUTO: 99.1 % — HIGH (ref 43–77)
NEUTS BAND # BLD: 0.9 % — SIGNIFICANT CHANGE UP (ref 0–8)
OVALOCYTES BLD QL SMEAR: SLIGHT — SIGNIFICANT CHANGE UP
PHOSPHATE SERPL-MCNC: 4.8 MG/DL — HIGH (ref 2.5–4.5)
PLAT MORPH BLD: ABNORMAL
PLATELET # BLD AUTO: 42 K/UL — LOW (ref 150–400)
POLYCHROMASIA BLD QL SMEAR: SLIGHT — SIGNIFICANT CHANGE UP
POTASSIUM SERPL-MCNC: 4.5 MMOL/L — SIGNIFICANT CHANGE UP (ref 3.5–5.3)
POTASSIUM SERPL-SCNC: 4.5 MMOL/L — SIGNIFICANT CHANGE UP (ref 3.5–5.3)
RBC # BLD: 3.61 M/UL — LOW (ref 4.2–5.8)
RBC # FLD: 17.1 % — HIGH (ref 10.3–14.5)
RBC BLD AUTO: ABNORMAL
RH IG SCN BLD-IMP: POSITIVE — SIGNIFICANT CHANGE UP
SODIUM SERPL-SCNC: 137 MMOL/L — SIGNIFICANT CHANGE UP (ref 135–145)
SPHEROCYTES BLD QL SMEAR: SLIGHT — SIGNIFICANT CHANGE UP
WBC # BLD: 25.5 K/UL — HIGH (ref 3.8–10.5)
WBC # FLD AUTO: 25.5 K/UL — HIGH (ref 3.8–10.5)

## 2022-04-27 PROCEDURE — 99233 SBSQ HOSP IP/OBS HIGH 50: CPT

## 2022-04-27 PROCEDURE — 85730 THROMBOPLASTIN TIME PARTIAL: CPT

## 2022-04-27 PROCEDURE — 82550 ASSAY OF CK (CPK): CPT

## 2022-04-27 PROCEDURE — 99239 HOSP IP/OBS DSCHRG MGMT >30: CPT

## 2022-04-27 PROCEDURE — P9045: CPT

## 2022-04-27 PROCEDURE — 84443 ASSAY THYROID STIM HORMONE: CPT

## 2022-04-27 PROCEDURE — 82962 GLUCOSE BLOOD TEST: CPT

## 2022-04-27 PROCEDURE — 0225U NFCT DS DNA&RNA 21 SARSCOV2: CPT

## 2022-04-27 PROCEDURE — 84484 ASSAY OF TROPONIN QUANT: CPT

## 2022-04-27 PROCEDURE — 86900 BLOOD TYPING SEROLOGIC ABO: CPT

## 2022-04-27 PROCEDURE — 83605 ASSAY OF LACTIC ACID: CPT

## 2022-04-27 PROCEDURE — 85027 COMPLETE CBC AUTOMATED: CPT

## 2022-04-27 PROCEDURE — 96374 THER/PROPH/DIAG INJ IV PUSH: CPT

## 2022-04-27 PROCEDURE — 87040 BLOOD CULTURE FOR BACTERIA: CPT

## 2022-04-27 PROCEDURE — 82803 BLOOD GASES ANY COMBINATION: CPT

## 2022-04-27 PROCEDURE — 86901 BLOOD TYPING SEROLOGIC RH(D): CPT

## 2022-04-27 PROCEDURE — 71045 X-RAY EXAM CHEST 1 VIEW: CPT

## 2022-04-27 PROCEDURE — 85652 RBC SED RATE AUTOMATED: CPT

## 2022-04-27 PROCEDURE — 87150 DNA/RNA AMPLIFIED PROBE: CPT

## 2022-04-27 PROCEDURE — 92610 EVALUATE SWALLOWING FUNCTION: CPT

## 2022-04-27 PROCEDURE — 80053 COMPREHEN METABOLIC PANEL: CPT

## 2022-04-27 PROCEDURE — 99285 EMERGENCY DEPT VISIT HI MDM: CPT

## 2022-04-27 PROCEDURE — 86850 RBC ANTIBODY SCREEN: CPT

## 2022-04-27 PROCEDURE — 86140 C-REACTIVE PROTEIN: CPT

## 2022-04-27 PROCEDURE — 85025 COMPLETE CBC W/AUTO DIFF WBC: CPT

## 2022-04-27 PROCEDURE — 80202 ASSAY OF VANCOMYCIN: CPT

## 2022-04-27 PROCEDURE — 84100 ASSAY OF PHOSPHORUS: CPT

## 2022-04-27 PROCEDURE — 85610 PROTHROMBIN TIME: CPT

## 2022-04-27 PROCEDURE — 81001 URINALYSIS AUTO W/SCOPE: CPT

## 2022-04-27 PROCEDURE — 83735 ASSAY OF MAGNESIUM: CPT

## 2022-04-27 PROCEDURE — 36415 COLL VENOUS BLD VENIPUNCTURE: CPT

## 2022-04-27 PROCEDURE — 83036 HEMOGLOBIN GLYCOSYLATED A1C: CPT

## 2022-04-27 PROCEDURE — 96375 TX/PRO/DX INJ NEW DRUG ADDON: CPT

## 2022-04-27 PROCEDURE — 80048 BASIC METABOLIC PNL TOTAL CA: CPT

## 2022-04-27 RX ORDER — HYDROMORPHONE HYDROCHLORIDE 2 MG/ML
0.5 INJECTION INTRAMUSCULAR; INTRAVENOUS; SUBCUTANEOUS EVERY 4 HOURS
Refills: 0 | Status: DISCONTINUED | OUTPATIENT
Start: 2022-04-27 | End: 2022-04-27

## 2022-04-27 RX ORDER — HYDROMORPHONE HYDROCHLORIDE 2 MG/ML
1 INJECTION INTRAMUSCULAR; INTRAVENOUS; SUBCUTANEOUS EVERY 6 HOURS
Refills: 0 | Status: DISCONTINUED | OUTPATIENT
Start: 2022-04-27 | End: 2022-04-27

## 2022-04-27 RX ORDER — SODIUM CHLORIDE 9 MG/ML
1000 INJECTION, SOLUTION INTRAVENOUS
Refills: 0 | Status: DISCONTINUED | OUTPATIENT
Start: 2022-04-27 | End: 2022-04-27

## 2022-04-27 RX ORDER — MIDODRINE HYDROCHLORIDE 2.5 MG/1
1 TABLET ORAL
Qty: 0 | Refills: 0 | DISCHARGE
Start: 2022-04-27

## 2022-04-27 RX ORDER — HYDROMORPHONE HYDROCHLORIDE 2 MG/ML
2 INJECTION INTRAMUSCULAR; INTRAVENOUS; SUBCUTANEOUS EVERY 4 HOURS
Refills: 0 | Status: DISCONTINUED | OUTPATIENT
Start: 2022-04-27 | End: 2022-04-27

## 2022-04-27 RX ORDER — HYDROMORPHONE HYDROCHLORIDE 2 MG/ML
0.5 INJECTION INTRAMUSCULAR; INTRAVENOUS; SUBCUTANEOUS
Qty: 0 | Refills: 0 | DISCHARGE
Start: 2022-04-27

## 2022-04-27 RX ORDER — CEFTRIAXONE 500 MG/1
2 INJECTION, POWDER, FOR SOLUTION INTRAMUSCULAR; INTRAVENOUS
Qty: 0 | Refills: 0 | DISCHARGE
Start: 2022-04-27 | End: 2022-04-30

## 2022-04-27 RX ORDER — DEXAMETHASONE 0.5 MG/5ML
1 ELIXIR ORAL
Qty: 0 | Refills: 0 | DISCHARGE
Start: 2022-04-27

## 2022-04-27 RX ORDER — PANTOPRAZOLE SODIUM 20 MG/1
1 TABLET, DELAYED RELEASE ORAL
Qty: 0 | Refills: 0 | DISCHARGE
Start: 2022-04-27

## 2022-04-27 RX ORDER — HYDROMORPHONE HYDROCHLORIDE 2 MG/ML
0.25 INJECTION INTRAMUSCULAR; INTRAVENOUS; SUBCUTANEOUS EVERY 4 HOURS
Refills: 0 | Status: DISCONTINUED | OUTPATIENT
Start: 2022-04-27 | End: 2022-04-27

## 2022-04-27 RX ORDER — METRONIDAZOLE 500 MG
100 TABLET ORAL
Qty: 0 | Refills: 0 | DISCHARGE
Start: 2022-04-27 | End: 2022-05-09

## 2022-04-27 RX ADMIN — HYDROMORPHONE HYDROCHLORIDE 0.5 MILLIGRAM(S): 2 INJECTION INTRAMUSCULAR; INTRAVENOUS; SUBCUTANEOUS at 12:30

## 2022-04-27 RX ADMIN — MIDODRINE HYDROCHLORIDE 5 MILLIGRAM(S): 2.5 TABLET ORAL at 01:26

## 2022-04-27 RX ADMIN — Medication 100 MILLIGRAM(S): at 06:17

## 2022-04-27 RX ADMIN — CHLORHEXIDINE GLUCONATE 1 APPLICATION(S): 213 SOLUTION TOPICAL at 06:18

## 2022-04-27 RX ADMIN — PANTOPRAZOLE SODIUM 40 MILLIGRAM(S): 20 TABLET, DELAYED RELEASE ORAL at 06:18

## 2022-04-27 RX ADMIN — HYDROMORPHONE HYDROCHLORIDE 0.5 MILLIGRAM(S): 2 INJECTION INTRAMUSCULAR; INTRAVENOUS; SUBCUTANEOUS at 06:16

## 2022-04-27 RX ADMIN — HYDROMORPHONE HYDROCHLORIDE 1 MILLIGRAM(S): 2 INJECTION INTRAMUSCULAR; INTRAVENOUS; SUBCUTANEOUS at 10:54

## 2022-04-27 RX ADMIN — SODIUM CHLORIDE 70 MILLILITER(S): 9 INJECTION, SOLUTION INTRAVENOUS at 11:38

## 2022-04-27 RX ADMIN — HYDROMORPHONE HYDROCHLORIDE 0.5 MILLIGRAM(S): 2 INJECTION INTRAMUSCULAR; INTRAVENOUS; SUBCUTANEOUS at 11:57

## 2022-04-27 RX ADMIN — CEFTRIAXONE 100 MILLIGRAM(S): 500 INJECTION, POWDER, FOR SOLUTION INTRAMUSCULAR; INTRAVENOUS at 06:27

## 2022-04-27 RX ADMIN — MIDODRINE HYDROCHLORIDE 5 MILLIGRAM(S): 2.5 TABLET ORAL at 09:30

## 2022-04-27 RX ADMIN — Medication 2 MILLIGRAM(S): at 06:18

## 2022-04-27 RX ADMIN — HYDROMORPHONE HYDROCHLORIDE 1 MILLIGRAM(S): 2 INJECTION INTRAMUSCULAR; INTRAVENOUS; SUBCUTANEOUS at 11:54

## 2022-04-27 RX ADMIN — HYDROMORPHONE HYDROCHLORIDE 0.5 MILLIGRAM(S): 2 INJECTION INTRAMUSCULAR; INTRAVENOUS; SUBCUTANEOUS at 06:31

## 2022-04-27 RX ADMIN — Medication 100 MILLIGRAM(S): at 13:09

## 2022-04-27 NOTE — PROGRESS NOTE ADULT - PROBLEM SELECTOR PLAN 6
.  - HCP sister June Berger #[phone]  - DNR DNI, No PEG.  - 4/26 reiterated pts poor long term prognosis and unlikelihood to receive further disease modifying treatments. Shared decision made to being transition to care with exclusive focus on comfort   - Education on EOL symptom management, nutritional needs provided at bedside

## 2022-04-27 NOTE — PROGRESS NOTE ADULT - SUBJECTIVE AND OBJECTIVE BOX
SUBJECTIVE: pt seen and examined this morning. gf Brittany at bedside. pt comfort care, reporting pain to sacrum. RN made aware, dilaudid 0.5 mg IV administered. ongoing coordination of care with hedy Camacho and primary team for transfer to [facility] for wound and end of life care. Allowed Brittany to vent frustrations about pts declining clinical status and fluctuating mentation and ability to tolerate safe PO.     OVERNIGHT EVENTS:    DNR in chart:    If  [ ] blank, symptom not present  Dyspnea:                           [ ]Mild [ ]Moderate [ ]Severe  Anxiety:                             [ ]Mild [ ]Moderate [ ]Severe  Fatigue:                             [ ]Mild [ ]Moderate [ ]Severe  Nausea:                             [ ]Mild [ ]Moderate [ ]Severe  Loss of appetite:              [ ]Mild [ ]Moderate [ ]Severe  Constipation:                    [ ]Mild [ ]Moderate [ ]Severe  Grief Present                    [ ] Yes   [ ] No     Pain:  Yes[  ]  No[  ]  Location :        Quality:  Radiation:  Timing:  Aggravating factors:  Minimal acceptable level (0-10 scale):   Severity in last 24h (0-10 scale) :  Current score (0-10 scale):  Improves with:     Rest of 12 point review of systems negative unless documented otherwise in note.  Unable to obtain ROS due to pt mentation.   ROS limited due to patient mentation.     PEx:  T(C): 36.1 (04-27-22 @ 05:30), Max: 36.7 (04-26-22 @ 21:00)  HR: 98 (04-27-22 @ 09:25) (91 - 99)  BP: 97/61 (04-27-22 @ 09:25) (86/50 - 111/71)  RR: 18 (04-27-22 @ 05:30) (17 - 24)  SpO2: 98% (04-27-22 @ 05:30) (95% - 100%)  Wt(kg): --    General: alert  oriented x ____    lethargic distressed cachexia  verbal nonverbal  unarousable   HEENT: no abnormal lesion, dry mouth, No  ET tube/trach oral lesions:  Lungs: tachypnea/labored breathing, audible excessive secretions  CV: RRR, S1S2, tachycardia  GI: soft non distended non tender  incontinent               PEG/NG/OG tube  constipation  last BM:   : incontinent  oliguria/anuria  cam  Musculoskeletal: weakness x4 edema x4    ambulatory with assistance   mostly/fully bedbound/wheelchair bound  Skin: no abnormal skin lesions, poor skin turgor, pressure ulcer stage:   Neuro: no deficits, cognitive impairment dsyphagia/dysarthria paresis    Oral intake ability: unable/only mouth care, minimal moderate full capability    Palliative Performance Scale/Karnofsky Score:  ECOG Performance:    ALLERGIES: penicillin (Unknown)  sulfamethoxazole (Unknown)      MEDICATIONS: REVIEWED  MEDICATIONS  (STANDING):  cefTRIAXone   IVPB 2000 milliGRAM(s) IV Intermittent every 24 hours  chlorhexidine 2% Cloths 1 Application(s) Topical <User Schedule>  dexAMETHasone     Tablet 2 milliGRAM(s) Oral every 12 hours  dextrose 5% + lactated ringers. 1000 milliLiter(s) (70 mL/Hr) IV Continuous <Continuous>  dextrose 5%. 1000 milliLiter(s) (50 mL/Hr) IV Continuous <Continuous>  dextrose 5%. 1000 milliLiter(s) (100 mL/Hr) IV Continuous <Continuous>  dextrose 50% Injectable 25 Gram(s) IV Push once  dextrose 50% Injectable 12.5 Gram(s) IV Push once  dextrose 50% Injectable 25 Gram(s) IV Push once  glucagon  Injectable 1 milliGRAM(s) IntraMuscular once  insulin lispro (ADMELOG) corrective regimen sliding scale   SubCutaneous every 6 hours  metroNIDAZOLE  IVPB 500 milliGRAM(s) IV Intermittent every 8 hours  midodrine 5 milliGRAM(s) Oral every 8 hours  pantoprazole    Tablet 40 milliGRAM(s) Oral before breakfast    MEDICATIONS  (PRN):  bisacodyl Suppository 10 milliGRAM(s) Rectal daily PRN Constipation  dextrose Oral Gel 15 Gram(s) Oral once PRN Blood Glucose LESS THAN 70 milliGRAM(s)/deciliter  HYDROmorphone   Tablet 2 milliGRAM(s) Oral every 4 hours PRN Mild Pain (1 - 3)  HYDROmorphone  Injectable 0.5 milliGRAM(s) IV Push every 4 hours PRN Moderate Pain (4 - 6)      CRITICAL CARE:  [ ]Shock Present  [ ]Septic [ ]Cardiogenic [ ]Neurologic [ ]Hypovolemic    [ ]Vasopressors [ ]Inotropes    [ ]Respiratory failure present   [ ]Mechanical Ventilation   [  ]Non-invasive ventilatory support   [ ]High-Flow  [ ]Acute  [ ]Chronic   [ ]Hypoxic  [ ]Hypercarbic   [ ]Other  [ ]Other organ failure     LABS: REVIEWED  CBC:                        8.4    25.50 )-----------( 42       ( 27 Apr 2022 07:22 )             29.6     CMP:    04-27    137  |  105  |  59<H>  ----------------------------<  67<L>  4.5   |  20<L>  |  1.04    Ca    8.3<L>      27 Apr 2022 07:22  Phos  4.8     04-27  Mg     2.4     04-27    TPro  5.2<L>  /  Alb  2.5<L>  /  TBili  0.4  /  DBili  x   /  AST  79<H>  /  ALT  73<H>  /  AlkPhos  344<H>  04-26      RADIOLOGY & ADDITIONAL STUDIES:     PROTEIN CALORIE MALNUTRITION PRESENT:  [ ]mild [ ]moderate []severe [ ]underweight [ ]morbid obesity  [ ] PPSV2 < or = 30%   [ ] significant weight loss   [ ] poor nutritional intake   [ ] anasarca   [ ] catabolic state      [ ] Artificial Nutrition     PSYCHOSOCIAL ASSESSMENT:  - Protestant/Spiritual practice:  - Coping: [ ] well [ ] with difficulty [ ] poor coping [ ] unable to assess dt pt mentation  - What is most important to patient?  - What worries patient the most?    DISCUSSION OF CASE:      CARE COORDINATION:      SUBJECTIVE: pt seen and examined this morning. gf Brittany at bedside. pt comfort care, reporting pain to sacrum. RN made aware, dilaudid 0.5 mg IV administered. no oversedation, nausea, dizziness with opioids. ongoing coordination of care with hedy Camacho and primary team for transfer to [facility] for wound and end of life care. Allowed Brittany to vent frustrations about pts declining clinical status and fluctuating mentation and ability to tolerate safe PO.     DNR in chart: Yes     If  [ ] blank, symptom not present  Dyspnea:                           [ ]Mild [ ]Moderate [ ]Severe  Anxiety:                             [ ]Mild [ ]Moderate [ ]Severe  Fatigue:                             [ ]Mild [ ]Moderate [ x]Severe  Nausea:                             [ ]Mild [ ]Moderate [ ]Severe  Loss of appetite:              [ ]Mild [ ]Moderate [ x]Severe  Constipation:                    [ ]Mild [ ]Moderate [ ]Severe  Grief Present                    [ ] Yes   [ x] No     Pain:  Yes[x  ]  No[  ]  Location :      sacrum, wounds, lower back   Quality:  sharp   Timing: constant   Aggravating factors: wounds  Current score (0-10 scale): severe   Improves with:  opioids        ROS limited due to patient mentation.     PEx:  T(C): 36.1 (04-27-22 @ 05:30), Max: 36.7 (04-26-22 @ 21:00)  HR: 98 (04-27-22 @ 09:25) (91 - 99)  BP: 97/61 (04-27-22 @ 09:25) (86/50 - 111/71)  RR: 18 (04-27-22 @ 05:30) (17 - 24)  SpO2: 98% (04-27-22 @ 05:30) (95% - 100%)  Wt(kg): --    General: oriented x name lethargic, chronically ill and weak appearing   HEENT: atraumatic, No abnormal lesions, +dry mouth, + temporal wasting,   RESP: Reg rhythm,  No tachypnea/labored breathing, No   audible excessive secretions, diminished bilat bases  CV: RRR, S1S2,  No  tachycardia  GI: soft non distended non tender  incontinent   : oliguria  cam  MUSK: weakness x4,  fully  BB bound  SKIN: Stage II L shoulder ulcer , unstageable sacral ulcer   NEURO:   inattention, more alert and verbal today     Oral intake ability: unable/only mouth care, minimal  capability    Palliative Performance Scale/Karnofsky Score: 20  ECOG Performance: 4    ALLERGIES: penicillin (Unknown)  sulfamethoxazole (Unknown)      MEDICATIONS: REVIEWED  MEDICATIONS  (STANDING):  cefTRIAXone   IVPB 2000 milliGRAM(s) IV Intermittent every 24 hours  chlorhexidine 2% Cloths 1 Application(s) Topical <User Schedule>  dexAMETHasone     Tablet 2 milliGRAM(s) Oral every 12 hours  dextrose 5% + lactated ringers. 1000 milliLiter(s) (70 mL/Hr) IV Continuous <Continuous>  dextrose 5%. 1000 milliLiter(s) (50 mL/Hr) IV Continuous <Continuous>  dextrose 5%. 1000 milliLiter(s) (100 mL/Hr) IV Continuous <Continuous>  dextrose 50% Injectable 25 Gram(s) IV Push once  dextrose 50% Injectable 12.5 Gram(s) IV Push once  dextrose 50% Injectable 25 Gram(s) IV Push once  glucagon  Injectable 1 milliGRAM(s) IntraMuscular once  insulin lispro (ADMELOG) corrective regimen sliding scale   SubCutaneous every 6 hours  metroNIDAZOLE  IVPB 500 milliGRAM(s) IV Intermittent every 8 hours  midodrine 5 milliGRAM(s) Oral every 8 hours  pantoprazole    Tablet 40 milliGRAM(s) Oral before breakfast    MEDICATIONS  (PRN):  bisacodyl Suppository 10 milliGRAM(s) Rectal daily PRN Constipation  dextrose Oral Gel 15 Gram(s) Oral once PRN Blood Glucose LESS THAN 70 milliGRAM(s)/deciliter  HYDROmorphone   Tablet 2 milliGRAM(s) Oral every 4 hours PRN Mild Pain (1 - 3)  HYDROmorphone  Injectable 0.5 milliGRAM(s) IV Push every 4 hours PRN Moderate Pain (4 - 6)      CRITICAL CARE:  [ ]Shock Present  [ ]Septic [ ]Cardiogenic [ ]Neurologic [ ]Hypovolemic    [ ]Vasopressors [ ]Inotropes    [ ]Respiratory failure present   [ ]Mechanical Ventilation   [  ]Non-invasive ventilatory support   [ ]High-Flow  [ ]Acute  [ ]Chronic   [ ]Hypoxic  [ ]Hypercarbic   [ ]Other  [ ]Other organ failure     LABS: REVIEWED  CBC:                        8.4    25.50 )-----------( 42       ( 27 Apr 2022 07:22 )             29.6     CMP:    04-27    137  |  105  |  59<H>  ----------------------------<  67<L>  4.5   |  20<L>  |  1.04    Ca    8.3<L>      27 Apr 2022 07:22  Phos  4.8     04-27  Mg     2.4     04-27    TPro  5.2<L>  /  Alb  2.5<L>  /  TBili  0.4  /  DBili  x   /  AST  79<H>  /  ALT  73<H>  /  AlkPhos  344<H>  04-26      RADIOLOGY & ADDITIONAL STUDIES:     PROTEIN CALORIE MALNUTRITION PRESENT:  [ ]mild [ ]moderate [x]severe [ ]underweight [ ]morbid obesity  [x ] PPSV2 < or = 30%   [x ] significant weight loss   [x ] poor nutritional intake   [ ] anasarca   [ ] catabolic state      [ ] Artificial Nutrition     PSYCHOSOCIAL ASSESSMENT:  - Significant other/partner: [ x ] long term gf Brittany Children: [  ]  - Living Situation: Home [  ] Long term care [  ]  Rehab[ x ]  Other[  ]  - Support system: strong[  ] adequate[  ] inadequate[x  ]    COORDINATION OF CARE:  - appreciate hedy Martines assistance with discharge

## 2022-04-27 NOTE — DISCHARGE NOTE NURSING/CASE MANAGEMENT/SOCIAL WORK - PATIENT PORTAL LINK FT
You can access the FollowMyHealth Patient Portal offered by [facility] by registering at the following website: [email or web address]. By joining [employer]’s FollowMyHealth portal, you will also be able to view your health information using other applications (apps) compatible with our system.

## 2022-04-27 NOTE — DISCHARGE NOTE PROVIDER - NSDCCPCAREPLAN_GEN_ALL_CORE_FT
PRINCIPAL DISCHARGE DIAGNOSIS  Diagnosis: Sepsis  Assessment and Plan of Treatment: You were diagnosed with sepsis, a severe infection, likely caused by an infection of your urinary tract and your sacral wounds. This infection was controlled with antibiotics. You will continue on antibiotics for an additional           . You will follow up with your primary care physician upon discharge for surveillance that the infection does not return.        SECONDARY DISCHARGE DIAGNOSES  Diagnosis: Altered mental status  Assessment and Plan of Treatment:      PRINCIPAL DISCHARGE DIAGNOSIS  Diagnosis: Sepsis  Assessment and Plan of Treatment: You were diagnosed with sepsis, a severe infection, likely caused by an infection of your urinary tract and your sacral wounds. This infection was controlled with antibiotics. PLEASE CONTINUE CEFTRIAXONE 2G EVERY 12 HOURS UNTIL 4/30/2022 AND FLAGLY 500MG EVERY 8 HOURS UNTIL MAY 9TH 2022.   . You will follow up with your primary care physician upon discharge for surveillance that the infection does not return.        SECONDARY DISCHARGE DIAGNOSES  Diagnosis: Altered mental status  Assessment and Plan of Treatment:      PRINCIPAL DISCHARGE DIAGNOSIS  Diagnosis: Sepsis  Assessment and Plan of Treatment: You were diagnosed with sepsis, a severe infection, likely caused by an infection of your urinary tract and your sacral wounds. This infection was controlled with antibiotics. PLEASE CONTINUE CEFTRIAXONE 2G EVERY 24 HOURS UNTIL 4/30/2022 AND FLAGLY 500MG EVERY 8 HOURS UNTIL MAY 9TH 2022.   . You will follow up with your primary care physician upon discharge for surveillance that the infection does not return.        SECONDARY DISCHARGE DIAGNOSES  Diagnosis: Altered mental status  Assessment and Plan of Treatment:

## 2022-04-27 NOTE — PROGRESS NOTE ADULT - ASSESSMENT
82 yo M w/ PMHx of met colon Ca, urinary retention 2/2 BPH with chronic indwelling cam, UTI recently discharged from [facility] ED on 4/13 after cam replacement, HTN who presented from nursing home for AMS, fever, and hypotension. Admitted to MICU for septic shock 2/2 large necrotic sacral wound. Comfort care w continuation of abx, and IVF. Palliative care following for GOC, symptom management     ·	dc dilaudid 1 mg PO PRN, subtherapeutic  ·	start dilaudid 2 mg PO q4 PRN mild pain, dilaudid 0.5 mg IV q4 PRN severe pain  ·	pending transfer/discharge to [facility] today at 1pm

## 2022-04-27 NOTE — DISCHARGE NOTE NURSING/CASE MANAGEMENT/SOCIAL WORK - NSDCPEFALRISK_GEN_ALL_CORE
For information on Fall & Injury Prevention, visit: [email or web address] OR  [email or web address] OR  https://www.cdc.gov/steadi/patient.html

## 2022-04-27 NOTE — PROGRESS NOTE ADULT - PROVIDER SPECIALTY LIST ADULT
Internal Medicine
MICU
Palliative Care
Internal Medicine
MICU
Surgery
MICU
Surgery
Surgery
Palliative Care

## 2022-04-27 NOTE — DISCHARGE NOTE PROVIDER - NSDCMRMEDTOKEN_GEN_ALL_CORE_FT
cefpodoxime 100 mg oral tablet: 1 tab(s) orally 2 times a day    bisacodyl 10 mg rectal suppository: 1 suppository(ies) rectal once a day, As needed, Constipation  cefTRIAXone: 2 gram(s) intravenous every 8 hours  dexamethasone 2 mg oral tablet: 1 tab(s) orally every 12 hours  metroNIDAZOLE 500 mg/100 mL intravenous solution: 100 milliliter(s) intravenous every 8 hours  midodrine 5 mg oral tablet: 1 tab(s) orally every 8 hours  pantoprazole 40 mg oral delayed release tablet: 1 tab(s) orally once a day (before a meal)   bisacodyl 10 mg rectal suppository: 1 suppository(ies) rectal once a day, As needed, Constipation  cefTRIAXone: 2 gram(s) intravenous 2 times a day  dexamethasone 2 mg oral tablet: 1 tab(s) orally every 12 hours  HYDROmorphone: 0.5 milligram(s) intravenous every 4 hours  metroNIDAZOLE 500 mg/100 mL intravenous solution: 100 milliliter(s) intravenous every 8 hours  midodrine 5 mg oral tablet: 1 tab(s) orally every 8 hours  pantoprazole 40 mg oral delayed release tablet: 1 tab(s) orally once a day (before a meal)   bisacodyl 10 mg rectal suppository: 1 suppository(ies) rectal once a day, As needed, Constipation  cefTRIAXone: 2 gram(s) intravenous once a day  dexamethasone 2 mg oral tablet: 1 tab(s) orally every 12 hours  HYDROmorphone: 0.5 milligram(s) intravenous every 4 hours  metroNIDAZOLE 500 mg/100 mL intravenous solution: 100 milliliter(s) intravenous every 8 hours  midodrine 5 mg oral tablet: 1 tab(s) orally every 8 hours  pantoprazole 40 mg oral delayed release tablet: 1 tab(s) orally once a day (before a meal)

## 2022-04-27 NOTE — PROGRESS NOTE ADULT - PROBLEM SELECTOR PLAN 4
.  alb 1.7  Clinical evidence indicates that the patient has Severe protein calorie malnutrition/ 3rd degree    In context of     Acute Illness/Injury (>7days)    vs    Chronic Illness (>1 month)    Energy/Food intake <50% of estimated energy requirement >5 days  Weight loss: Moderate - severe   Body Fat loss: Severe   (Cachexia, temporal wasting, muscle atrophy)  Muscle mass loss: Severe  (Skin failure/pressure ulcers)   Strength: weakened severe (bedbound)    Recommend:   - severe dysphagia per SS eval  - PEG is Not within GOC -- 4/25 GOC note  - nutritional supplements as tolerated

## 2022-04-27 NOTE — DISCHARGE NOTE PROVIDER - HOSPITAL COURSE
#Discharge: do not delete    Patient is a 81 year old M w/ PMHx of colon CA with mets (on decadron 2 mg BID for pain and encorafenib 75 mg QD), urinary retention 2/2 BPH with chronic indwelling cam, UTI recently discharged from [facility] ED on 4/13 after cam replacement, HTN who presents from nursing home for AMS, fever, and hypotension.     Hospital course (by problem):     #sepsis:  Patient presenting with SBP in 70's, tachycardic, with leukocytosis (end organ damage including BRAN and transmanitis). Source of shock could be 2/2 to uti (last visit was growing e coli and proteus, unclear whether patient completed course of PO cefpodoxime upon discharge) vs sacral wounds. On admission patient encephalopathic, cool upper extremities with dusky fingers, large necrotic sacral wounds ( [id number] sacral deep pressure injury, stage 4, and smaller 5x5 cm ulcer). S/p fluid resuscitation.  Overall mental status improving, lactate negative.  BCx + bacteriodes fragilis. C/w ceftriaxone 2g q12 for 7 days (4/24-4/30). C/w home decadron 2mg q12hr (restarted 4/25. C/w flagyl 500 mg Q8hrs (4/25-5/9).    #toxic metabolic encephalopathy: Pt meets sepsis (3/4 SIRS criteria); s/p 6 L IVF. Baseline AOx2-3 per NH records, currently awake alert, verbal but unable to answer questions when prompted. Etiology most likely 2/2 to sepsis and dehydration due to acute change in mental status. Tx sepsis above. Comfort feeds initiated.       #thrombocytopenia: Pt t with downtrending plt in the setting of lovenox + vanc/zoysn + septic shock. plt 150-->42. No signs of bleeding. HIT w/u pending, although low suspicion for HIT    #BRAN: Patient with non-oliguric BRAN, BUN/Cr 31/0.90 -> 49/1.23 -> 43/0.91, etiology likely pre-renal in setting of septic shock, s/p 6 L IVF, overall BRAN stable, pt not oliguric.   - Continue fluids and monitor kidney function.    #Transaminitis: Etiology of transaminitis likely septic shock. LFTs dowtrending    #Anemia: Most likely 2/2 AOCD from Crohn's disease and colon CA. Hb stable.     #Sacral wound: Pt has large necrotic sacral wounds, possible cause of septic shock. S/p bedside debridement by surgery. See sepsis management above    Patient was discharged to: [facility]     New medications:   Changes to old medications:  Medications that were stopped:    Items to follow up as outpatient:    Physical exam at the time of discharge:  GENERAL: resting comfortably, no acute distress   HEAD:  Atraumatic, Normocephalic  EYES: PERRLA, conjunctiva and sclera clear  NECK: Supple, No JVD, Normal thyroid, no enlarged nodes  CHEST/LUNG: B/L good air entry; No rales, rhonchi, or wheezing  HEART: S1S2 normal, no S3, Regular rate and rhythm; No murmurs  ABDOMEN: Soft, Nontender, Nondistended; Bowel sounds present  EXTREMITIES:  2+ Peripheral Pulses, No clubbing, cyanosis, or edema  LYMPH: No lymphadenopathy noted  SKIN: No rashes or lesions  NERVOUS SYSTEM:  Alert & Awake, oriented x1-2, answering questions, visual hallucinations      #Discharge: do not delete    Patient is a 81 year old M w/ PMHx of colon CA with mets (on decadron 2 mg BID for pain and encorafenib 75 mg QD), urinary retention 2/2 BPH with chronic indwelling cam, UTI recently discharged from [facility] ED on 4/13 after cam replacement, HTN who presents from nursing home for AMS, fever, and hypotension.     Hospital course (by problem):     #sepsis:  Patient presenting with SBP in 70's, tachycardic, with leukocytosis (end organ damage including BRAN and transmanitis). Source of shock could be 2/2 to uti (last visit was growing e coli and proteus, unclear whether patient completed course of PO cefpodoxime upon discharge) vs sacral wounds. On admission patient encephalopathic, cool upper extremities with dusky fingers, large necrotic sacral wounds ( [id number] sacral deep pressure injury, stage 4, and smaller 5x5 cm ulcer). S/p fluid resuscitation.  Overall mental status improving, lactate negative.  BCx + bacteriodes fragilis. C/w ceftriaxone 2g q12 for 7 days (4/24-4/30). C/w home decadron 2mg q12hr (restarted 4/25. C/w flagyl 500 mg Q8hrs (4/25-5/9).    #toxic metabolic encephalopathy: Pt meets sepsis (3/4 SIRS criteria); s/p 6 L IVF. Baseline AOx2-3 per NH records, currently awake alert, verbal but unable to answer questions when prompted. Etiology most likely 2/2 to sepsis and dehydration due to acute change in mental status. Tx sepsis above. Comfort feeds initiated.     #thrombocytopenia: Pt t with downtrending plt in the setting of lovenox + vanc/zoysn + septic shock. plt 150-->42. No signs of bleeding. HIT w/u pending, although low suspicion for HIT    #BRAN: Patient with non-oliguric BRAN, BUN/Cr 31/0.90 -> 49/1.23 -> 43/0.91, etiology likely pre-renal in setting of septic shock, s/p 6 L IVF, overall BRAN stable, pt not oliguric.   - Continue fluids and monitor kidney function.    #Transaminitis: Etiology of transaminitis likely septic shock. LFTs dowtrending    #Anemia: Most likely 2/2 AOCD from Crohn's disease and colon CA. Hb stable.     #Sacral wound: Pt has large necrotic sacral wounds, possible cause of septic shock. S/p bedside debridement by surgery. See sepsis management above    Patient was discharged to: [facility]     New medications: CTX, Flagyl   Changes to old medications:  Medications that were stopped:    Items to follow up as outpatient:    Physical exam at the time of discharge:  GENERAL: resting comfortably, no acute distress   HEAD:  Atraumatic, Normocephalic  EYES: PERRLA, conjunctiva and sclera clear  NECK: Supple, No JVD, Normal thyroid, no enlarged nodes  CHEST/LUNG: B/L good air entry; No rales, rhonchi, or wheezing  HEART: S1S2 normal, no S3, Regular rate and rhythm; No murmurs  ABDOMEN: Soft, Nontender, Nondistended; Bowel sounds present  EXTREMITIES:  2+ Peripheral Pulses, No clubbing, cyanosis, or edema  LYMPH: No lymphadenopathy noted  SKIN: No rashes or lesions  NERVOUS SYSTEM:  Alert & Awake, oriented x1-2, answering questions, visual hallucinations      #Discharge: do not delete    Patient is a 81 year old M w/ PMHx of colon CA with mets (on decadron 2 mg BID for pain and encorafenib 75 mg QD), urinary retention 2/2 BPH with chronic indwelling cam, UTI recently discharged from [facility] ED on 4/13 after cam replacement, HTN who presents from nursing home for AMS, fever, and hypotension.     Hospital course (by problem):     #sepsis:  Patient presenting with SBP in 70's, tachycardic, with leukocytosis (end organ damage including BRAN and transmanitis). Source of shock could be 2/2 to uti (last visit was growing e coli and proteus, unclear whether patient completed course of PO cefpodoxime upon discharge) vs sacral wounds. On admission patient encephalopathic, cool upper extremities with dusky fingers, large necrotic sacral wounds ( [id number] sacral deep pressure injury, stage 4, and smaller 5x5 cm ulcer). S/p fluid resuscitation.  Overall mental status improving, lactate negative.  BCx + bacteriodes fragilis. C/w ceftriaxone 2g q24 for 7 days (4/24-4/30). C/w home decadron 2mg q12hr (restarted 4/25. C/w flagyl 500 mg Q8hrs (4/25-5/9).    #toxic metabolic encephalopathy: Pt meets sepsis (3/4 SIRS criteria); s/p 6 L IVF. Baseline AOx2-3 per NH records, currently awake alert, verbal but unable to answer questions when prompted. Etiology most likely 2/2 to sepsis and dehydration due to acute change in mental status. Tx sepsis above. Comfort feeds initiated.     #thrombocytopenia: Pt t with downtrending plt in the setting of lovenox + vanc/zoysn + septic shock. plt 150-->42. No signs of bleeding. HIT w/u pending, although low suspicion for HIT    #BRAN: Patient with non-oliguric BRAN, BUN/Cr 31/0.90 -> 49/1.23 -> 43/0.91, etiology likely pre-renal in setting of septic shock, s/p 6 L IVF, overall BRAN stable, pt not oliguric.   - Continue fluids and monitor kidney function.    #Transaminitis: Etiology of transaminitis likely septic shock. LFTs dowtrending    #Anemia: Most likely 2/2 AOCD from Crohn's disease and colon CA. Hb stable.     #Sacral wound: Pt has large necrotic sacral wounds, possible cause of septic shock. S/p bedside debridement by surgery. See sepsis management above    Patient was discharged to: [facility]     New medications: CTX, Flagyl   Changes to old medications:  Medications that were stopped:    Items to follow up as outpatient:    Physical exam at the time of discharge:  GENERAL: resting comfortably, no acute distress   HEAD:  Atraumatic, Normocephalic  EYES: PERRLA, conjunctiva and sclera clear  NECK: Supple, No JVD, Normal thyroid, no enlarged nodes  CHEST/LUNG: B/L good air entry; No rales, rhonchi, or wheezing  HEART: S1S2 normal, no S3, Regular rate and rhythm; No murmurs  ABDOMEN: Soft, Nontender, Nondistended; Bowel sounds present  EXTREMITIES:  2+ Peripheral Pulses, No clubbing, cyanosis, or edema  LYMPH: No lymphadenopathy noted  SKIN: No rashes or lesions  NERVOUS SYSTEM:  Alert & Awake, oriented x1-2, answering questions, visual hallucinations

## 2022-04-27 NOTE — PROGRESS NOTE ADULT - PROBLEM SELECTOR PLAN 2
.  ss cw severe dysphagia  - 4/25 GOC note: PEG is Not within GOC  - oral care ATC  - food for comfort with strict asp precautions if pt is awake willing to eat  - ongoing education provided to pts gf re: nutrition at EOL and respecting pts autonomy to refuse PO

## 2022-04-27 NOTE — PROGRESS NOTE ADULT - PROBLEM SELECTOR PLAN 7
.  Pending transfer to [facility] for EOL symptom management and wound care today at 1pm.     Active Psychosocial Referrals:   SW/CM[  ] PT/OT[  ] Chaplaincy[   ]Hospice[x  ]  Ethics[  ]  Viri Ortiz Patient/Family Support[  ] Holistic RN[  ] Massage Therapy[  ]    Coping:  well[  ] with difficulty[  ] poor coping[  ] unable to assess[ x ]   Support system: strong[  ] adequate[ x ] inadequate[  ]    - All questions answered, emotional support provided  -  primary team, Dr. Palomino   - For new or uncontrolled symptoms, please call Palliative Care at [phone]. The service is available 24/7 (including nights & weekends) to provide symptom management recommendations over the phone as appropriate

## 2022-04-27 NOTE — PROGRESS NOTE ADULT - PROBLEM SELECTOR PLAN 5
.  metastatic, poor PS, not candidate for further therapy  - GOC comfort, mews exempt  - prognosis weeks

## 2022-04-27 NOTE — PROGRESS NOTE ADULT - PROBLEM SELECTOR PLAN 3
.  admitted with the following pressure injuries: Upper back unstageable pressure injury measuring 13 x 12, Left hip with a deep tissue injury measuring 11.5 cm x 7.5 cm, right heel deep tissue injury measuring 3x 4.5 cm. Left ischial tuberosity unstageable measuring 8 x 9 cm,  Sacrum with Stage 4 pressure measuring 14 x 12 x 4 cm  - sepsis likely 2/2 seeding from wound  - appreciative surg and wound care recs   - pain management as above

## 2022-04-27 NOTE — PROGRESS NOTE ADULT - PROBLEM SELECTOR PLAN 1
.  extensive wounds. goc comfort.   - dc dilaudid 1 mg PO PRN, subtherapeutic  - start dilaudid 2 mg PO q4 PRN mild pain  - start dilaudid 0.5 mg IV q4 PRN severe pain  - Low threshold to repeat dose or increase PRN frequency to maximize comfort at EOL  - bisacodyl supp qD PRN, goal BM qD

## 2022-04-29 LAB
CULTURE RESULTS: SIGNIFICANT CHANGE UP
CULTURE RESULTS: SIGNIFICANT CHANGE UP
ORGANISM # SPEC MICROSCOPIC CNT: SIGNIFICANT CHANGE UP
SPECIMEN SOURCE: SIGNIFICANT CHANGE UP
SPECIMEN SOURCE: SIGNIFICANT CHANGE UP

## 2022-05-02 LAB
CULTURE RESULTS: SIGNIFICANT CHANGE UP
SPECIMEN SOURCE: SIGNIFICANT CHANGE UP

## 2022-05-05 DIAGNOSIS — G93.41 METABOLIC ENCEPHALOPATHY: ICD-10-CM

## 2022-05-05 DIAGNOSIS — A41.9 SEPSIS, UNSPECIFIED ORGANISM: ICD-10-CM

## 2022-05-05 DIAGNOSIS — E86.0 DEHYDRATION: ICD-10-CM

## 2022-05-05 DIAGNOSIS — N17.0 ACUTE KIDNEY FAILURE WITH TUBULAR NECROSIS: ICD-10-CM

## 2022-05-05 DIAGNOSIS — Z66 DO NOT RESUSCITATE: ICD-10-CM

## 2022-05-05 DIAGNOSIS — E43 UNSPECIFIED SEVERE PROTEIN-CALORIE MALNUTRITION: ICD-10-CM

## 2022-05-05 DIAGNOSIS — F41.9 ANXIETY DISORDER, UNSPECIFIED: ICD-10-CM

## 2022-05-05 DIAGNOSIS — Z79.2 LONG TERM (CURRENT) USE OF ANTIBIOTICS: ICD-10-CM

## 2022-05-05 DIAGNOSIS — D69.6 THROMBOCYTOPENIA, UNSPECIFIED: ICD-10-CM

## 2022-05-05 DIAGNOSIS — Z88.0 ALLERGY STATUS TO PENICILLIN: ICD-10-CM

## 2022-05-05 DIAGNOSIS — R41.82 ALTERED MENTAL STATUS, UNSPECIFIED: ICD-10-CM

## 2022-05-05 DIAGNOSIS — C18.9 MALIGNANT NEOPLASM OF COLON, UNSPECIFIED: ICD-10-CM

## 2022-05-05 DIAGNOSIS — Z51.5 ENCOUNTER FOR PALLIATIVE CARE: ICD-10-CM

## 2022-05-05 DIAGNOSIS — L89.892 PRESSURE ULCER OF OTHER SITE, STAGE 2: ICD-10-CM

## 2022-05-05 DIAGNOSIS — R74.01 ELEVATION OF LEVELS OF LIVER TRANSAMINASE LEVELS: ICD-10-CM

## 2022-05-05 DIAGNOSIS — L89.154 PRESSURE ULCER OF SACRAL REGION, STAGE 4: ICD-10-CM

## 2022-05-05 DIAGNOSIS — D63.8 ANEMIA IN OTHER CHRONIC DISEASES CLASSIFIED ELSEWHERE: ICD-10-CM

## 2022-05-05 DIAGNOSIS — I10 ESSENTIAL (PRIMARY) HYPERTENSION: ICD-10-CM

## 2022-05-05 DIAGNOSIS — K50.90 CROHN'S DISEASE, UNSPECIFIED, WITHOUT COMPLICATIONS: ICD-10-CM

## 2022-05-05 DIAGNOSIS — R65.21 SEVERE SEPSIS WITH SEPTIC SHOCK: ICD-10-CM

## 2022-05-05 DIAGNOSIS — B96.6 BACTEROIDES FRAGILIS [B. FRAGILIS] AS THE CAUSE OF DISEASES CLASSIFIED ELSEWHERE: ICD-10-CM

## 2022-05-05 DIAGNOSIS — Z88.2 ALLERGY STATUS TO SULFONAMIDES: ICD-10-CM

## 2022-05-05 DIAGNOSIS — N39.0 URINARY TRACT INFECTION, SITE NOT SPECIFIED: ICD-10-CM

## 2022-05-05 DIAGNOSIS — R13.10 DYSPHAGIA, UNSPECIFIED: ICD-10-CM

## 2022-05-05 DIAGNOSIS — N40.1 BENIGN PROSTATIC HYPERPLASIA WITH LOWER URINARY TRACT SYMPTOMS: ICD-10-CM

## 2023-12-20 NOTE — ED ADULT TRIAGE NOTE - BSA (M2)
[facility]  Progress Note  Name: Miguel Angel Ortega I  MRN: [id number]  Unit/Bed#: -Theo I Date of Admission: 12/13/2023   Date of Service: 12/20/2023 I Hospital Day: 7    Assessment/Plan   * Stercoral colitis  Assessment & Plan  Patient presents with symptoms of constipation for the last few days.  He saw GI outpatient.  He was started on MiraLAX patient states he went 26 times to the bathroom yesterday had very small amounts of stool and felt like he just could not empty out his bowel movement.  Also had a fall yesterday.  Ct abd pelvis shows Abundant stool in the colon and rectal vault compatible with constipation in the appropriate clinical context. No bowel obstruction.  Minimal perirectal fat stranding suggestive of mild or early stercoral proctitis. No perirectal abscess  Placed on MiraLAX daily and placed on Dulcolax suppository x 2 daily and also placed 3 enemas    GI consult placed -repeat obstruction series without significant stool burden.  Starting on Amitiza twice daily.  Advance to regular diet    Gram-negative bacteremia  Assessment & Plan  Proteus bacteremia from gi source.  ID consulted: increase rocephin to 2g q24h, f/u blood and urine cultures, GI consult for persistent constipation/fecal impaction, continue zuniga, anticipate 10 days abx therapy.   Repeat BC pending    Orthostatic hypotension  Assessment & Plan  Patient has been having episodes of orthostatic hypotension outpatient. Orthostatic vitals inpatient positive  Cardiology consult: Suspect this is secondary to poor PO intake and blood loss. Please encourage PO intake. Nursing instructed to apply compression socks while awake. ECG demonstrates sinus bradycardia. HR's controlled. OK to continue digoxin and Multaq.   Will place on gentle IVF rehydration - stopped  If persistent hypotension, may benefit from limited echo    Acute on chronic anemia  Assessment & Plan  Hemoglobin dropped from 10.3-8.7.  low iron and  B12 levels. placed on vit b12 and venofer  CBC stable  no evidence of bleeding noted  neg stool occult    Lab Results   Component Value Date    HGB 8.3 (L) 12/20/2023    HGB 8.6 (L) 12/19/2023    HGB 8.4 (L) 12/18/2023       Permanent atrial fibrillation (HCC)  Assessment & Plan  Continue Multaq, digoxin which are his chronic meds.  Not on anticoagulation  Dig level acceptable  Appreciate cardio eval - continue multaq and dig. Was on xarelto in the past - discuss outpatient with his cardiologist about resumption    Acute urinary retention  Assessment & Plan  Acute urinary retention secondary to severe constipation also known history of prostate cancer.  Zuniga catheter placed as patient bladder scan for 800 cc on presentation to ED and 1800 ml removed in zuniga. Also placed on Flomax   Zuniga removed 12/15 and placed on voiding trial so far patient is voiding but he woke up multiple times to urinate overnight .Will continue voiding trial today.  Some hypotension noted which may be secondary to Flomax and placed on midodrine - will dc flomax and midodrine  Patient with significant amount of hematuria still.   Urology initially recommending voiding trial in a few days.  Evaluated again on 12/20 - continue hand irrigation, may need to transition to CBI    Prostate cancer metastatic to bone (HCC)  Assessment & Plan  Further outpatient follow-up with urology.  Placed on Flomax for acute urinary retention hold Casodex while inpatient.  stop flomax due to urinary urgency and orthostatic hypotension             VTE Pharmacologic Prophylaxis:   High Risk (Score >/= 5) - Pharmacological DVT Prophylaxis Contraindicated. Sequential Compression Devices Ordered.    Mobility:   Basic Mobility Inpatient Raw Score: 18  JH-HLM Goal: 6: Walk 10 steps or more  JH-HLM Achieved: 5: Stand (1 or more minutes)  HLM Goal NOT achieved. Continue with multidisciplinary rounding and encourage appropriate mobility to improve upon HLM  goals.    Patient Centered Rounds: I performed bedside rounds with nursing staff today.   Discussions with Specialists or Other Care Team Provider: Nursing, CM, gastroenterology, cardiology    Education and Discussions with Family / Patient: Updated  (friend) via phone.    Total Time Spent on Date of Encounter in care of patient: This time was spent on one or more of the following: performing physical exam; counseling and coordination of care; obtaining or reviewing history; documenting in the medical record; reviewing/ordering tests, medications or procedures; communicating with other healthcare professionals and discussing with patient's family/caregivers.    Current Length of Stay: 7 day(s)  Current Patient Status: Inpatient   Certification Statement: The patient will continue to require additional inpatient hospital stay due to requiring IV antibiotics for bacteremia  Discharge Plan: Anticipate discharge in 48 hrs to rehab facility.    Code Status: Level 3 - DNAR and DNI    Subjective:   Patient states that he is feeling well today and had multiple bowel movements overnight with bowel prep.  Agreeable to current plan.  Denies chest pain, shortness of breath, abdominal pain or nausea.    Objective:     Vitals:   Temp (24hrs), Av.6 °F (36.4 °C), Min:97.2 °F (36.2 °C), Max:98.1 °F (36.7 °C)    Temp:  [97.2 °F (36.2 °C)-98.1 °F (36.7 °C)] 97.5 °F (36.4 °C)  HR:  [64-73] 64  Resp:  [17] 17  BP: (100-138)/(35-55) 138/52  SpO2:  [97 %-100 %] 100 %  Body mass index is 22.73 kg/m².     Input and Output Summary (last 24 hours):     Intake/Output Summary (Last 24 hours) at 2023 1556  Last data filed at 2023 0543  Gross per 24 hour   Intake 120 ml   Output 1000 ml   Net -880 ml       Physical Exam:   Physical Exam  Vitals reviewed.   Constitutional:       General: He is not in acute distress.     Appearance: Normal appearance. He is not ill-appearing.   HENT:      Head: Normocephalic and  atraumatic.      Nose: Nose normal.      Mouth/Throat:      Mouth: Mucous membranes are moist.      Pharynx: Oropharynx is clear.   Eyes:      Extraocular Movements: Extraocular movements intact.      Conjunctiva/sclera: Conjunctivae normal.   Cardiovascular:      Rate and Rhythm: Normal rate and regular rhythm.      Pulses: Normal pulses.      Heart sounds: Normal heart sounds. No murmur heard.  Pulmonary:      Effort: Pulmonary effort is normal. No respiratory distress.      Breath sounds: Normal breath sounds. No wheezing or rhonchi.   Abdominal:      General: Abdomen is flat. Bowel sounds are normal. There is no distension.      Palpations: Abdomen is soft.      Tenderness: There is no abdominal tenderness. There is no guarding.   Musculoskeletal:         General: Normal range of motion.      Cervical back: Normal range of motion.      Right lower leg: No edema.      Left lower leg: No edema.   Skin:     General: Skin is warm.   Neurological:      General: No focal deficit present.      Mental Status: He is alert and oriented to person, place, and time. Mental status is at baseline.      Motor: No weakness.   Psychiatric:         Mood and Affect: Mood normal.         Behavior: Behavior normal.         Thought Content: Thought content normal.         Judgment: Judgment normal.          Additional Data:     Labs:  Results from last 7 days   Lab Units 12/20/23  0532   WBC Thousand/uL 7.42   HEMOGLOBIN g/dL 8.3*   HEMATOCRIT % 24.6*   PLATELETS Thousands/uL 177     Results from last 7 days   Lab Units 12/20/23  0532 12/15/23  0453 12/14/23  0546   SODIUM mmol/L 135   < > 133*   POTASSIUM mmol/L 3.8   < > 4.3   CHLORIDE mmol/L 101   < > 105   CO2 mmol/L 28   < > 25   BUN mg/dL 21   < > 15   CREATININE mg/dL 0.87   < > 0.94   ANION GAP mmol/L 6   < > 3   CALCIUM mg/dL 7.9*   < > 7.7*   ALBUMIN g/dL  --   --  3.2*   TOTAL BILIRUBIN mg/dL  --   --  0.46   ALK PHOS U/L  --   --  50   ALT U/L  --   --  7   AST U/L  --    --  15   GLUCOSE RANDOM mg/dL 94   < > 89    < > = values in this interval not displayed.                 Results from last 7 days   Lab Units 12/17/23  1648   LACTIC ACID mmol/L 1.2       Lines/Drains:  Invasive Devices       Peripheral Intravenous Line  Duration             Peripheral IV 12/17/23 Right Arm 3 days              Drain  Duration             Urethral Catheter Double-lumen 18 Fr. 1 day                  Urinary Catheter:  Goal for removal:  Continue Ascencio with plan for voiding trial in a few days.               Imaging: Reviewed radiology reports from this admission including: xray(s)    Recent Cultures (last 7 days):   Results from last 7 days   Lab Units 12/19/23  1548 12/19/23  0803 12/17/23  1643   BLOOD CULTURE  Received in Microbiology Lab. Culture in Progress.  Received in Microbiology Lab. Culture in Progress.  --  Proteus mirabilis*  Proteus mirabilis*   GRAM STAIN RESULT   --   --  Gram negative rods*  Gram negative rods*   URINE CULTURE   --  No Growth <1000 cfu/mL  --        Last 24 Hours Medication List:   Current Facility-Administered Medications   Medication Dose Route Frequency Provider Last Rate    acetaminophen  650 mg Oral Q6H PRN Sherita Martinez MD      bisacodyl  10 mg Rectal Daily Sherita Martinez MD      cefTRIAXone  2,000 mg Intravenous Q24H Suha Alvarado PA-C 2,000 mg (12/20/23 1555)    cyanocobalamin  1,000 mcg Oral Daily Sherita Martinez MD      digoxin  125 mcg Oral Daily Sherita Martinez MD      dronedarone  400 mg Oral BID With Meals Sherita Martinez MD      folic acid  1,000 mcg Oral Daily Sherita Martinez MD      glycerin-hypromellose-  1 drop Both Eyes Q4H PRN Sherita Martinez MD      hydrocortisone   Topical 4x Daily PRN Sherita Martinez MD      melatonin  3 mg Oral HS PRN Suha Alvarado PA-C      multi-electrolyte  50 mL/hr Intravenous Continuous Suha Alvarado PA-C 50 mL/hr (12/19/23 1942)    ondansetron  4 mg Intravenous Q6H PRN Sherita Martinez MD      pantoprazole  40 mg  Oral Daily Sherita Martinez MD      polyethylene glycol  17 g Oral BID Sherita Martinez MD      primidone  50 mg Oral Q12H YAMINI Sherita Martinez MD      senna-docusate sodium  1 tablet Oral Daily With Breakfast Sherita Martinez MD          Today, Patient Was Seen By: Remedios Cruz PA-C    **Please Note: This note may have been constructed using a voice recognition system.**     1.78

## 2025-06-09 NOTE — CONSULT NOTE ADULT - PROBLEM SELECTOR PROBLEM 1
Patient Education   Preventive Care Advice   This is general advice given by our system to help you stay healthy. However, your care team may have specific advice just for you. Please talk to your care team about your preventive care needs.  Nutrition  Eat 5 or more servings of fruits and vegetables each day.  Try wheat bread, brown rice and whole grain pasta (instead of white bread, rice, and pasta).  Get enough calcium and vitamin D. Check the label on foods and aim for 100% of the RDA (recommended daily allowance).  Lifestyle  Exercise at least 150 minutes each week  (30 minutes a day, 5 days a week).  Do muscle strengthening activities 2 days a week. These help control your weight and prevent disease.  No smoking.  Wear sunscreen to prevent skin cancer.  Have a dental exam and cleaning every 6 months.  Yearly exams  See your health care team every year to talk about:  Any changes in your health.  Any medicines your care team has prescribed.  Preventive care, family planning, and ways to prevent chronic diseases.  Shots (vaccines)   HPV shots (up to age 26), if you've never had them before.  Hepatitis B shots (up to age 59), if you've never had them before.  COVID-19 shot: Get this shot when it's due.  Flu shot: Get a flu shot every year.  Tetanus shot: Get a tetanus shot every 10 years.  Pneumococcal, hepatitis A, and RSV shots: Ask your care team if you need these based on your risk.  Shingles shot (for age 50 and up)  General health tests  Diabetes screening:  Starting at age 35, Get screened for diabetes at least every 3 years.  If you are younger than age 35, ask your care team if you should be screened for diabetes.  Cholesterol test: At age 39, start having a cholesterol test every 5 years, or more often if advised.  Bone density scan (DEXA): At age 50, ask your care team if you should have this scan for osteoporosis (brittle bones).  Hepatitis C: Get tested at least once in your life.  STIs (sexually  transmitted infections)  Before age 24: Ask your care team if you should be screened for STIs.  After age 24: Get screened for STIs if you're at risk. You are at risk for STIs (including HIV) if:  You are sexually active with more than one person.  You don't use condoms every time.  You or a partner was diagnosed with a sexually transmitted infection.  If you are at risk for HIV, ask about PrEP medicine to prevent HIV.  Get tested for HIV at least once in your life, whether you are at risk for HIV or not.  Cancer screening tests  Cervical cancer screening: If you have a cervix, begin getting regular cervical cancer screening tests starting at age 21.  Breast cancer scan (mammogram): If you've ever had breasts, begin having regular mammograms starting at age 40. This is a scan to check for breast cancer.  Colon cancer screening: It is important to start screening for colon cancer at age 45.  Have a colonoscopy test every 10 years (or more often if you're at risk) Or, ask your provider about stool tests like a FIT test every year or Cologuard test every 3 years.  To learn more about your testing options, visit:   .  For help making a decision, visit:   https://bit.ly/nf57777.  Prostate cancer screening test: If you have a prostate, ask your care team if a prostate cancer screening test (PSA) at age 55 is right for you.  Lung cancer screening: If you are a current or former smoker ages 50 to 80, ask your care team if ongoing lung cancer screenings are right for you.  For informational purposes only. Not to replace the advice of your health care provider. Copyright   2023 [facility]. All rights reserved. Clinically reviewed by the [facility]. [employer] [id number] - REV 01/24.  Preventing Falls: Care Instructions  Injuries and health problems such as trouble walking or poor eyesight can increase your risk of falling. So can some medicines. But there are things you can do to help  "prevent falls. You can exercise to get stronger. You can also arrange your home to make it safer.    Talk to your doctor about the medicines you take. Ask if any of them increase the risk of falls and whether they can be changed or stopped.   Try to exercise regularly. It can help improve your strength and balance. This can help lower your risk of falling.         Practice fall safety and prevention.   Wear low-heeled shoes that fit well and give your feet good support. Talk to your doctor if you have foot problems that make this hard.  Carry a cellphone or wear a medical alert device that you can use to call for help.  Use stepladders instead of chairs to reach high objects. Don't climb if you're at risk for falls. Ask for help, if needed.  Wear the correct eyeglasses, if you need them.        Make your home safer.   Remove rugs, cords, clutter, and furniture from walkways.  Keep your house well lit. Use night-lights in hallways and bathrooms.  Install and use sturdy handrails on stairways.  Wear nonskid footwear, even inside. Don't walk barefoot or in socks without shoes.        Be safe outside.   Use handrails, curb cuts, and ramps whenever possible.  Keep your hands free by using a shoulder bag or backpack.  Try to walk in well-lit areas. Watch out for uneven ground, changes in pavement, and debris.  Be careful in the winter. Walk on the grass or gravel when sidewalks are slippery. Use de-icer on steps and walkways. Add non-slip devices to shoes.    Put grab bars and nonskid mats in your shower or tub and near the toilet. Try to use a shower chair or bath bench when bathing.   Get into a tub or shower by putting in your weaker leg first. Get out with your strong side first. Have a phone or medical alert device in the bathroom with you.   Where can you learn more?  Go to https://www.[employer].net/patiented  Enter G117 in the search box to learn more about \"Preventing Falls: Care Instructions.\"  Current as of: " July 31, 2024  Content Version: 14.5    9283-7137 [employer].   Care instructions adapted under license by your healthcare professional. If you have questions about a medical condition or this instruction, always ask your healthcare professional. [employer] disclaims any warranty or liability for your use of this information.    Substance Use Disorder: Care Instructions  Overview     You can improve your life and health by stopping your use of alcohol or drugs. When you don't drink or use drugs, you may feel and sleep better. You may get along better with your family, friends, and coworkers. There are medicines and programs that can help with substance use disorder.  How can you care for yourself at home?  Here are some ways to help you stay sober and prevent relapse.  If you have been given medicine to help keep you sober or reduce your cravings, be sure to take it exactly as prescribed.  Talk to your doctor about programs that can help you stop using drugs or drinking alcohol.  Do not keep alcohol or drugs in your home.  Plan ahead. Think about what you'll say if other people ask you to drink or use drugs. Try not to spend time with people who drink or use drugs.  Use the time and money spent on drinking or drugs to do something that's important to you.  Preventing a relapse  Have a plan to deal with relapse. Learn to recognize changes in your thinking that lead you to drink or use drugs. Get help before you start to drink or use drugs again.  Try to stay away from situations, friends, or places that may lead you to drink or use drugs.  If you feel the need to drink alcohol or use drugs again, seek help right away. Call a trusted friend or family member. Some people get support from organizations such as Narcotics Anonymous or [employer] or from treatment facilities.  If you relapse, get help as soon as you can. Some people make a plan with another person that outlines what they want  that person to do for them if they relapse. The plan usually includes how to handle the relapse and who to notify in case of relapse.  Don't give up. Remember that a relapse doesn't mean that you have failed. Use the experience to learn the triggers that lead you to drink or use drugs. Then quit again. Recovery is a lifelong process. Many people have several relapses before they are able to quit for good.  Follow-up care is a key part of your treatment and safety. Be sure to make and go to all appointments, and call your doctor if you are having problems. It's also a good idea to know your test results and keep a list of the medicines you take.  When should you call for help?   Call 911  anytime you think you may need emergency care. For example, call if you or someone else:    Has overdosed or has withdrawal signs. Be sure to tell the emergency workers that you are or someone else is using or trying to quit using drugs. Overdose or withdrawal signs may include:  Losing consciousness.  Seizure.  Seeing or hearing things that aren't there (hallucinations).     Is thinking or talking about suicide or harming others.   Where to get help 24 hours a day, 7 days a week   If you or someone you know talks about suicide, self-harm, a mental health crisis, a substance use crisis, or any other kind of emotional distress, get help right away. You can:    Call the Suicide and Crisis Lifeline at 98.     Call 0-518-937-WCTN (1-996.257.6962).     Text HOME to 297897 to access the Crisis Text Line.   Consider saving these numbers in your phone.  Go to [employer]line.org for more information or to chat online.  Call your doctor now or seek immediate medical care if:    You are having withdrawal symptoms. These may include nausea or vomiting, sweating, shakiness, and anxiety.   Watch closely for changes in your health, and be sure to contact your doctor if:    You have a relapse.     You need more help or support to stop.   Where can  "you learn more?  Go to https://www.[employer].net/patiented  Enter H573 in the search box to learn more about \"Substance Use Disorder: Care Instructions.\"  Current as of: August 20, 2024  Content Version: 14.5 2024-2025 [employer].   Care instructions adapted under license by your healthcare professional. If you have questions about a medical condition or this instruction, always ask your healthcare professional. [employer] disclaims any warranty or liability for your use of this information.       " Pain